# Patient Record
Sex: FEMALE | Race: BLACK OR AFRICAN AMERICAN | Employment: FULL TIME | URBAN - METROPOLITAN AREA
[De-identification: names, ages, dates, MRNs, and addresses within clinical notes are randomized per-mention and may not be internally consistent; named-entity substitution may affect disease eponyms.]

---

## 2017-08-26 ENCOUNTER — APPOINTMENT (OUTPATIENT)
Dept: GENERAL RADIOLOGY | Age: 32
End: 2017-08-26
Attending: EMERGENCY MEDICINE
Payer: COMMERCIAL

## 2017-08-26 ENCOUNTER — APPOINTMENT (OUTPATIENT)
Dept: CT IMAGING | Age: 32
End: 2017-08-26
Attending: EMERGENCY MEDICINE
Payer: COMMERCIAL

## 2017-08-26 ENCOUNTER — HOSPITAL ENCOUNTER (OUTPATIENT)
Age: 32
Setting detail: OBSERVATION
Discharge: HOME | End: 2017-08-28
Attending: EMERGENCY MEDICINE | Admitting: INTERNAL MEDICINE
Payer: COMMERCIAL

## 2017-08-26 DIAGNOSIS — G45.9 TRANSIENT CEREBRAL ISCHEMIA, UNSPECIFIED TYPE: Primary | ICD-10-CM

## 2017-08-26 DIAGNOSIS — R55 POSTURAL DIZZINESS WITH PRESYNCOPE: ICD-10-CM

## 2017-08-26 DIAGNOSIS — E55.9 VITAMIN D DEFICIENCY: ICD-10-CM

## 2017-08-26 DIAGNOSIS — R42 POSTURAL DIZZINESS WITH PRESYNCOPE: ICD-10-CM

## 2017-08-26 DIAGNOSIS — H53.133 ACUTE LOSS OF VISION, BILATERAL: ICD-10-CM

## 2017-08-26 DIAGNOSIS — E53.8 B12 DEFICIENCY: ICD-10-CM

## 2017-08-26 LAB
ALBUMIN SERPL-MCNC: 3.8 G/DL (ref 3.5–5)
ALBUMIN/GLOB SERPL: 0.8 {RATIO} (ref 1.1–2.2)
ALP SERPL-CCNC: 86 U/L (ref 45–117)
ALT SERPL-CCNC: 25 U/L (ref 12–78)
ANION GAP SERPL CALC-SCNC: 6 MMOL/L (ref 5–15)
AST SERPL-CCNC: 18 U/L (ref 15–37)
BASOPHILS # BLD: 0 K/UL (ref 0–0.1)
BASOPHILS NFR BLD: 0 % (ref 0–1)
BILIRUB SERPL-MCNC: 0.2 MG/DL (ref 0.2–1)
BUN SERPL-MCNC: 10 MG/DL (ref 6–20)
BUN/CREAT SERPL: 16 (ref 12–20)
CALCIUM SERPL-MCNC: 9.2 MG/DL (ref 8.5–10.1)
CHLORIDE SERPL-SCNC: 100 MMOL/L (ref 97–108)
CO2 SERPL-SCNC: 29 MMOL/L (ref 21–32)
CREAT SERPL-MCNC: 0.61 MG/DL (ref 0.55–1.02)
EOSINOPHIL # BLD: 0 K/UL (ref 0–0.4)
EOSINOPHIL NFR BLD: 0 % (ref 0–7)
ERYTHROCYTE [DISTWIDTH] IN BLOOD BY AUTOMATED COUNT: 15.7 % (ref 11.5–14.5)
ERYTHROCYTE [SEDIMENTATION RATE] IN BLOOD: 10 MM/HR (ref 0–20)
GLOBULIN SER CALC-MCNC: 4.8 G/DL (ref 2–4)
GLUCOSE BLD STRIP.AUTO-MCNC: 81 MG/DL (ref 65–100)
GLUCOSE SERPL-MCNC: 85 MG/DL (ref 65–100)
HCG UR QL: NEGATIVE
HCT VFR BLD AUTO: 37.5 % (ref 35–47)
HGB BLD-MCNC: 12.6 G/DL (ref 11.5–16)
INR PPP: 1 (ref 0.9–1.1)
LYMPHOCYTES # BLD: 1.6 K/UL (ref 0.8–3.5)
LYMPHOCYTES NFR BLD: 32 % (ref 12–49)
MCH RBC QN AUTO: 30.8 PG (ref 26–34)
MCHC RBC AUTO-ENTMCNC: 33.6 G/DL (ref 30–36.5)
MCV RBC AUTO: 91.7 FL (ref 80–99)
MONOCYTES # BLD: 0.6 K/UL (ref 0–1)
MONOCYTES NFR BLD: 13 % (ref 5–13)
NEUTS SEG # BLD: 2.7 K/UL (ref 1.8–8)
NEUTS SEG NFR BLD: 55 % (ref 32–75)
PLATELET # BLD AUTO: 268 K/UL (ref 150–400)
POTASSIUM SERPL-SCNC: 4 MMOL/L (ref 3.5–5.1)
PROT SERPL-MCNC: 8.6 G/DL (ref 6.4–8.2)
PROTHROMBIN TIME: 10.1 SEC (ref 9–11.1)
RBC # BLD AUTO: 4.09 M/UL (ref 3.8–5.2)
SERVICE CMNT-IMP: NORMAL
SODIUM SERPL-SCNC: 135 MMOL/L (ref 136–145)
TSH SERPL DL<=0.05 MIU/L-ACNC: 1.17 UIU/ML (ref 0.36–3.74)
WBC # BLD AUTO: 5 K/UL (ref 3.6–11)

## 2017-08-26 PROCEDURE — 85610 PROTHROMBIN TIME: CPT | Performed by: EMERGENCY MEDICINE

## 2017-08-26 PROCEDURE — 74011250637 HC RX REV CODE- 250/637: Performed by: INTERNAL MEDICINE

## 2017-08-26 PROCEDURE — 70450 CT HEAD/BRAIN W/O DYE: CPT

## 2017-08-26 PROCEDURE — 82962 GLUCOSE BLOOD TEST: CPT

## 2017-08-26 PROCEDURE — 93005 ELECTROCARDIOGRAM TRACING: CPT

## 2017-08-26 PROCEDURE — 74011250636 HC RX REV CODE- 250/636: Performed by: INTERNAL MEDICINE

## 2017-08-26 PROCEDURE — 85652 RBC SED RATE AUTOMATED: CPT | Performed by: INTERNAL MEDICINE

## 2017-08-26 PROCEDURE — 36415 COLL VENOUS BLD VENIPUNCTURE: CPT | Performed by: EMERGENCY MEDICINE

## 2017-08-26 PROCEDURE — 99218 HC RM OBSERVATION: CPT

## 2017-08-26 PROCEDURE — 71010 XR CHEST PORT: CPT

## 2017-08-26 PROCEDURE — 99285 EMERGENCY DEPT VISIT HI MDM: CPT

## 2017-08-26 PROCEDURE — 80053 COMPREHEN METABOLIC PANEL: CPT | Performed by: EMERGENCY MEDICINE

## 2017-08-26 PROCEDURE — 71020 XR CHEST PA LAT: CPT

## 2017-08-26 PROCEDURE — 85025 COMPLETE CBC W/AUTO DIFF WBC: CPT | Performed by: EMERGENCY MEDICINE

## 2017-08-26 PROCEDURE — 81025 URINE PREGNANCY TEST: CPT

## 2017-08-26 PROCEDURE — 84443 ASSAY THYROID STIM HORMONE: CPT | Performed by: INTERNAL MEDICINE

## 2017-08-26 PROCEDURE — 96372 THER/PROPH/DIAG INJ SC/IM: CPT

## 2017-08-26 PROCEDURE — 86140 C-REACTIVE PROTEIN: CPT | Performed by: INTERNAL MEDICINE

## 2017-08-26 RX ORDER — GUAIFENESIN 100 MG/5ML
81 LIQUID (ML) ORAL DAILY
Status: DISCONTINUED | OUTPATIENT
Start: 2017-08-27 | End: 2017-08-28

## 2017-08-26 RX ORDER — LABETALOL HYDROCHLORIDE 5 MG/ML
5 INJECTION, SOLUTION INTRAVENOUS
Status: DISCONTINUED | OUTPATIENT
Start: 2017-08-26 | End: 2017-08-28 | Stop reason: HOSPADM

## 2017-08-26 RX ORDER — SODIUM CHLORIDE 0.9 % (FLUSH) 0.9 %
5-10 SYRINGE (ML) INJECTION AS NEEDED
Status: DISCONTINUED | OUTPATIENT
Start: 2017-08-26 | End: 2017-08-28 | Stop reason: HOSPADM

## 2017-08-26 RX ORDER — ENOXAPARIN SODIUM 100 MG/ML
40 INJECTION SUBCUTANEOUS DAILY
Status: DISCONTINUED | OUTPATIENT
Start: 2017-08-26 | End: 2017-08-28 | Stop reason: HOSPADM

## 2017-08-26 RX ORDER — ACETAMINOPHEN 325 MG/1
650 TABLET ORAL
Status: DISCONTINUED | OUTPATIENT
Start: 2017-08-26 | End: 2017-08-28 | Stop reason: HOSPADM

## 2017-08-26 RX ORDER — ACETAMINOPHEN 650 MG/1
650 SUPPOSITORY RECTAL
Status: DISCONTINUED | OUTPATIENT
Start: 2017-08-26 | End: 2017-08-28 | Stop reason: HOSPADM

## 2017-08-26 RX ORDER — LANOLIN ALCOHOL/MO/W.PET/CERES
325 CREAM (GRAM) TOPICAL
COMMUNITY
End: 2017-08-28

## 2017-08-26 RX ORDER — SODIUM CHLORIDE 0.9 % (FLUSH) 0.9 %
5-10 SYRINGE (ML) INJECTION EVERY 8 HOURS
Status: DISCONTINUED | OUTPATIENT
Start: 2017-08-26 | End: 2017-08-28 | Stop reason: HOSPADM

## 2017-08-26 RX ORDER — ASPIRIN 325 MG
325 TABLET ORAL ONCE
Status: COMPLETED | OUTPATIENT
Start: 2017-08-26 | End: 2017-08-26

## 2017-08-26 RX ADMIN — ENOXAPARIN SODIUM 40 MG: 40 INJECTION SUBCUTANEOUS at 21:06

## 2017-08-26 RX ADMIN — ASPIRIN 325 MG ORAL TABLET 325 MG: 325 PILL ORAL at 21:06

## 2017-08-26 RX ADMIN — Medication 10 ML: at 21:07

## 2017-08-26 NOTE — ED PROVIDER NOTES
HPI Comments: Drake Benito is a 36 y.o. female presenting via EMS to [facility] c/o intermittent left face and left arm numbness since ~ 1.5 hours ago. Pt notes additional symptoms of dizziness and left eye blurry vision. She reports that she has had 3 episodes of the dizziness since her symptoms started. Each of her episodes have last for a few seconds. Her first episode occurred while driving, and after sitting for a while her symptoms resolved, but then returned 2 more times. Pt notes that ~1.5 years ago she had similar symptoms and was evaluated at [facility], and no diagnosis was given to her at the time. She has not had any follow up since then, and has not had any issues with her symptoms until today. Per pt, she is right handed. Pt notes that she gave birth 2 months ago. She denies headache, neck pain, double vision, eye pain, falls, head injury, use of birth control, hx of HTN, or FMHx of brain aneurysms. PCP: Not On File [facility]  Social Hx: - tobacco, + EtOH (occasional), - illicit drugs    There are no other complaints, changes, or physical findings at this time. The history is provided by the patient. No  was used. Past Medical History:   Diagnosis Date    Asthma     HX OTHER MEDICAL     uterine fibroids    Infertility     has had in vitro in the past    Unspecified adverse effect of anesthesia     pt had to be put to sleep for  because pt could still feel with epidural       Past Surgical History:   Procedure Laterality Date    HX  SECTION           Family History:   Problem Relation Age of Onset    Diabetes Father     Hypertension Father      ? Social History     Social History    Marital status:      Spouse name: N/A    Number of children: N/A    Years of education: N/A     Occupational History    Not on file.      Social History Main Topics    Smoking status: Never Smoker    Smokeless tobacco: Never Used    Alcohol use 0.5 oz/week     1 Cans of beer per week      Comment: 1 can beer occasionally    Drug use: No    Sexual activity: Yes     Partners: Male     Birth control/ protection: None     Other Topics Concern    Not on file     Social History Narrative         ALLERGIES: Hydroquinone    Review of Systems   Constitutional: Negative. Negative for chills and fever. HENT: Negative. Negative for congestion and rhinorrhea. Eyes: Positive for visual disturbance (left eye blurry). Negative for pain. Respiratory: Negative. Negative for cough, chest tightness and wheezing. Cardiovascular: Negative. Negative for chest pain and palpitations. Gastrointestinal: Negative. Negative for abdominal pain, constipation, nausea and vomiting. Endocrine: Negative. Genitourinary: Negative. Negative for decreased urine volume, flank pain, hematuria and pelvic pain. Musculoskeletal: Negative. Negative for back pain and neck pain. Skin: Negative. Negative for color change, pallor and rash. Neurological: Positive for dizziness and numbness. Negative for seizures, weakness and headaches. Hematological: Negative. Negative for adenopathy. Psychiatric/Behavioral: Negative. All other systems reviewed and are negative. Vitals:    08/26/17 1830 08/26/17 1845 08/26/17 1900 08/26/17 2017   BP:    (!) 133/92   Pulse: 68 78 83 69   Resp: 16 20 17 18   Temp:    98.1 °F (36.7 °C)   SpO2: 100% 100% 100% 100%            Physical Exam   Constitutional: She is oriented to person, place, and time. She appears well-developed and well-nourished. No distress. HENT:   Head: Normocephalic and atraumatic. Mouth/Throat: No oropharyngeal exudate. Eyes: Conjunctivae are normal. Pupils are equal, round, and reactive to light. Right eye exhibits no discharge. Left eye exhibits no discharge. No scleral icterus. Neck: Normal range of motion and phonation normal. Neck supple. No JVD present. Carotid bruit is not present. Cardiovascular: Normal rate, regular rhythm, normal heart sounds and intact distal pulses. Exam reveals no gallop and no friction rub. No murmur heard. Pulmonary/Chest: Effort normal and breath sounds normal. No stridor. No respiratory distress. She has no wheezes. She has no rales. She exhibits no tenderness. Abdominal: Soft. Bowel sounds are normal. She exhibits no distension and no mass. There is no tenderness. There is no rebound and no guarding. Neurological: She is alert and oriented to person, place, and time. She has normal strength. She is not disoriented. She displays no tremor and normal reflexes. A sensory deficit is present. No cranial nerve deficit. She exhibits normal muscle tone. She displays a negative Romberg sign. She displays no seizure activity. Coordination normal. GCS eye subscore is 4. GCS verbal subscore is 5. GCS motor subscore is 6. Reflex Scores:       Patellar reflexes are 2+ on the right side and 2+ on the left side. Subject left face and left arm, has mostly resolved   Skin: Skin is warm. No rash noted. She is not diaphoretic. No pallor. Nursing note and vitals reviewed. MDM  Number of Diagnoses or Management Options  Transient cerebral ischemia, unspecified type:   Diagnosis management comments: DDx: CVA, seizure, complicated migraine, sagittal sinus thrombosis, subarachnoid hemorrhage, vertebral artery dissection, pre-eclampsia    Impression/plan: Patient is 2 months post-partum without compliocation to the ER with intermittent brief episodes of left facial and left arm numbness with associated dizziness. She has had 3 episodes since 1:30 PM. On arrival her symptoms have improved almost to baseline. She is normotensive and not on birth control pills. Will discuss case with neurology. Will probably need admission for further evaluation.        Amount and/or Complexity of Data Reviewed  Clinical lab tests: ordered and reviewed  Tests in the radiology section of CPT®: ordered and reviewed  Tests in the medicine section of CPT®: ordered and reviewed  Obtain history from someone other than the patient: yes  Review and summarize past medical records: yes  Discuss the patient with other providers: yes (Neurology, Hospitalist)  Independent visualization of images, tracings, or specimens: yes    Risk of Complications, Morbidity, and/or Mortality  Presenting problems: moderate  Diagnostic procedures: moderate  Management options: moderate    Patient Progress  Patient progress: stable    ED Course       Procedures    EKG interpretation: (Preliminary)  2:19 PM  Rhythm: normal sinus rhythm; and regular . Rate (approx.): 66; Axis: normal; UT interval: normal; QRS interval: Q wave leads 2, 3; ST/T wave: normal.    Health system  1985    Arrival time to ED: 2:20 PM    Code S Called: 2:36 PM    Physician at Bedside: 2:39 PM     CT Order Time: 2:45 PM    ACT Page: 3:12 PM    ACT Call Back: 3:17 PM    Cancel Code S: 3:17 PM    CONSULT NOTE:  3:17 PM  Lisy Fuentes MD spoke with Dr. Margarita Sears  Specialty: Neurology  Discussed pt's hx, disposition, and available diagnostic and imaging results. Reviewed care plans. Consultant agrees with plans as outlined. Dr. Atul Kearney does not think pt needs TPA or emergency CTA due to low NIH score of probably 1 or less. Dr. Atul Kearney does recommend admission, MRI, MRA, and possible MRV to rule out sagittal sinus thrombosis. Written by Ventura Anton, ED Scribe, as dictated by Lisy Fuentes MD.    CONSULT NOTE:   4:49 PM  Lisy Fuentes MD spoke with Dr. Oscar Odom,   Specialty: Hospitalist  Discussed pt's hx, disposition, and available diagnostic and imaging results. Reviewed care plans. Consultant will evaluate pt for admission.   Written by Ventura Anton ED Scribe, as dictated by Lisy Fuentes MD.    LABORATORY TESTS:  Recent Results (from the past 12 hour(s))   GLUCOSE, POC    Collection Time: 08/26/17  2:48 PM   Result Value Ref Range    Glucose (POC) 81 65 - 100 mg/dL    Performed by Tonia Potter    HCG URINE, QL. - POC    Collection Time: 08/26/17  2:56 PM   Result Value Ref Range    Pregnancy test,urine (POC) NEGATIVE  NEG     CBC WITH AUTOMATED DIFF    Collection Time: 08/26/17  3:41 PM   Result Value Ref Range    WBC 5.0 3.6 - 11.0 K/uL    RBC 4.09 3.80 - 5.20 M/uL    HGB 12.6 11.5 - 16.0 g/dL    HCT 37.5 35.0 - 47.0 %    MCV 91.7 80.0 - 99.0 FL    MCH 30.8 26.0 - 34.0 PG    MCHC 33.6 30.0 - 36.5 g/dL    RDW 15.7 (H) 11.5 - 14.5 %    PLATELET 830 704 - 302 K/uL    NEUTROPHILS 55 32 - 75 %    LYMPHOCYTES 32 12 - 49 %    MONOCYTES 13 5 - 13 %    EOSINOPHILS 0 0 - 7 %    BASOPHILS 0 0 - 1 %    ABS. NEUTROPHILS 2.7 1.8 - 8.0 K/UL    ABS. LYMPHOCYTES 1.6 0.8 - 3.5 K/UL    ABS. MONOCYTES 0.6 0.0 - 1.0 K/UL    ABS. EOSINOPHILS 0.0 0.0 - 0.4 K/UL    ABS. BASOPHILS 0.0 0.0 - 0.1 K/UL   PROTHROMBIN TIME + INR    Collection Time: 08/26/17  3:41 PM   Result Value Ref Range    INR 1.0 0.9 - 1.1      Prothrombin time 10.1 9.0 - 12.5 sec   METABOLIC PANEL, COMPREHENSIVE    Collection Time: 08/26/17  3:41 PM   Result Value Ref Range    Sodium 135 (L) 136 - 145 mmol/L    Potassium 4.0 3.5 - 5.1 mmol/L    Chloride 100 97 - 108 mmol/L    CO2 29 21 - 32 mmol/L    Anion gap 6 5 - 15 mmol/L    Glucose 85 65 - 100 mg/dL    BUN 10 6 - 20 MG/DL    Creatinine 0.61 0.55 - 1.02 MG/DL    BUN/Creatinine ratio 16 12 - 20      GFR est AA >60 >60 ml/min/1.73m2    GFR est non-AA >60 >60 ml/min/1.73m2    Calcium 9.2 8.5 - 10.1 MG/DL    Bilirubin, total 0.2 0.2 - 1.0 MG/DL    ALT (SGPT) 25 12 - 78 U/L    AST (SGOT) 18 15 - 37 U/L    Alk.  phosphatase 86 45 - 117 U/L    Protein, total 8.6 (H) 6.4 - 8.2 g/dL    Albumin 3.8 3.5 - 5.0 g/dL    Globulin 4.8 (H) 2.0 - 4.0 g/dL    A-G Ratio 0.8 (L) 1.1 - 2.2         IMAGING RESULTS:    EXAM:  CT CODE NEURO HEAD WO CONTRAST     INDICATION:   Near syncopal episode-decreased sensation left arm and face     COMPARISON: None.     CONTRAST:  None.     TECHNIQUE: Unenhanced CT of the head was performed using 5 mm images. Brain and  bone windows were generated. CT dose reduction was achieved through use of a  standardized protocol tailored for this examination and automatic exposure  control for dose modulation.       TECHNIQUE:    Unenhanced CT of the head was performed using 5 mm images. Brain and bone  windows were generated. Coronal and sagittal reformatted images were then  obtained. CT dose reduction was achieved through the use of a standardized  protocol tailored for this examination and automatic exposure control for dose  modulation.     FINDINGS:  The ventricles and sulci are normal in size, shape and configuration and  midline. There is no intracranial hemorrhage, extra-axial collection, mass, mass effect  or midline shift. The basilar cisterns are open. No acute infarct is identified. Focal mucosal thickening of the left ethmoid air cell is noted. The mastoid air cells are clear.     IMPRESSION  IMPRESSION:   Normal ventricular size.     No intracranial bleed or shift of the midline.          MEDICATIONS GIVEN:  Medications   sodium chloride (NS) flush 5-10 mL (not administered)   sodium chloride (NS) flush 5-10 mL (10 mL IntraVENous Given 8/26/17 2107)   sodium chloride (NS) flush 5-10 mL (not administered)   aspirin chewable tablet 81 mg (not administered)   labetalol (NORMODYNE;TRANDATE) injection 5 mg (not administered)   acetaminophen (TYLENOL) tablet 650 mg (not administered)     Or   acetaminophen (TYLENOL) solution 650 mg (not administered)     Or   acetaminophen (TYLENOL) suppository 650 mg (not administered)   enoxaparin (LOVENOX) injection 40 mg (40 mg SubCUTAneous Given 8/26/17 2106)   aspirin (ASPIRIN) tablet 325 mg (325 mg Oral Given 8/26/17 2106)       IMPRESSION:  1. Transient cerebral ischemia, unspecified type        PLAN:  1.  Admit to hospitalist    ADMIT NOTE:  4:49 PM  Patient is being admitted to the hospital by Dr. Kimberli Banerjee. The results of their tests and reasons for their admission have been discussed with the patient and/or available family. They convey agreement and understanding for the need to be admitted and for their admission diagnosis. This note is prepared by Yonatan Ellison, acting as Scribe for MD Adrienne Gonzales MD: The scribe's documentation has been prepared under my direction and personally reviewed by me in its entirety. I confirm that the note above accurately reflects all work, treatment, procedures, and medical decision making performed by me.

## 2017-08-26 NOTE — ED NOTES
Patient was provided dinner tray and was able to eat without difficulty. She is resting in bed in no apparent distress. Call bell in reach, bed in low locked position. Tech at bedside performing repeat EKG.

## 2017-08-26 NOTE — IP AVS SNAPSHOT
[address] [facility] 
[phone] Patient: Leonel Clark MRN: [id number] :1985 You are allergic to the following Allergen Reactions Hydroquinone Itching Recent Documentation Breastfeeding? OB Status Smoking Status No Having regular periods Never Smoker Unresulted Labs Order Current Status ANGIOTENSIN CONVERTING ENZYME In process PROTEIN C ACTIVITY In process Emergency Contacts Name Discharge Info Relation Home Work Mobile Ariadne Burch DISCHARGE CAREGIVER [3] Other Relative [6]   [phone] Jabari Armstrong  Brother [24] [phone] About your hospitalization You were admitted on:  2017 You last received care in the:  [facility] 3 NEUROSCIENCE TELEMETRY You were discharged on:  2017 Unit phone number:  [phone] Why you were hospitalized Your primary diagnosis was:  Not on File Your diagnoses also included:  Tia (Transient Ischemic Attack), Hyperlipidemia, Pharyngitis Providers Seen During Your Hospitalizations Provider Role Specialty Primary office phone Tacho Ronquillo MD Attending Provider Emergency Medicine [phone] Julianne Lara MD Attending Provider Hospitalist [phone] Your Primary Care Physician (PCP) Primary Care Physician Office Phone Office Fax NOT ON FILE ** None ** ** None ** Follow-up Information Follow up With Details Comments Contact Info Dr. Gisel Kelly On 2017 1;40pm hospital follow-up [facility], [address] [place] [address] [phone] Shira Orozco On 2017 2;00pm  neurology  follow-up [address] [phone] Current Discharge Medication List  
  
START taking these medications Dose & Instructions Dispensing Information Comments Morning Noon Evening Bedtime  
 acetaminophen 325 mg tablet Commonly known as:  TYLENOL Your last dose was: Your next dose is:    
   
   
 Dose:  650 mg Take 2 Tabs by mouth every four (4) hours as needed for Fever (For temp greater than or equal to 38.5 C or 101.3 F (Unless hepatic failure or contrindicated). Give first line for fever. ). Quantity:  40 Tab Refills:  0  
     
   
   
   
  
 aspirin 81 mg chewable tablet Your last dose was: Your next dose is:    
   
   
 Dose:  81 mg Take 1 Tab by mouth daily. Quantity:  30 Tab Refills:  1  
     
   
   
   
  
 atorvastatin 20 mg tablet Commonly known as:  LIPITOR Your last dose was: Your next dose is:    
   
   
 Dose:  20 mg Take 1 Tab by mouth daily. Quantity:  30 Tab Refills:  1  
     
   
   
   
  
 azithromycin 500 mg Tab Commonly known as:  Seferino Ellis Your last dose was: Your next dose is:    
   
   
 Dose:  500 mg Take 1 Tab by mouth daily. Quantity:  5 Tab Refills:  0 CONTINUE these medications which have NOT CHANGED Dose & Instructions Dispensing Information Comments Morning Noon Evening Bedtime MOTRIN 600 mg tablet Generic drug:  ibuprofen Your last dose was: Your next dose is:    
   
   
 Dose:  600 mg Take 600 mg by mouth as needed for Pain. Refills:  0 STOP taking these medications Iron 325 mg (65 mg iron) tablet Generic drug:  ferrous sulfate PERCOCET 5-325 mg per tablet Generic drug:  oxyCODONE-acetaminophen Where to Get Your Medications Information on where to get these meds will be given to you by the nurse or doctor. ! Ask your nurse or doctor about these medications  
  acetaminophen 325 mg tablet  
 aspirin 81 mg chewable tablet  
 atorvastatin 20 mg tablet azithromycin 500 mg Tab Discharge Instructions HOSPITALIST DISCHARGE INSTRUCTIONS 
NAME: Lupe Reid :  1985 MRN:  [id number] Date/Time:  2017 2:58 PM 
 
ADMIT DATE: 2017 DISCHARGE DATE: 2017 ADMITTING DIAGNOSIS:  TIA, Asthma, H/o Delivery 2 months ago, Pharyngitis, hyperlipidemia, MEDICATIONS: 
  
 
         As per medication reconciliation · It is important that you take the medication exactly as they are prescribed. · Keep your medication in the bottles provided by the pharmacist and keep a list of the medication names, dosages, and times to be taken in your wallet. · Do not take other medications without consulting your doctor. Pain Management: per above medications What to do at [facility] Recommended diet:  Regular Diet Recommended activity: Activity as tolerated If you experience any of the following symptoms then please call your primary care physician or return to the emergency room if you cannot get hold of your doctor: 
Fever, chills, nausea, vomiting, diarrhea, change in mentation, falling, bleeding, shortness of breath. Follow Up: 
 PCP you are to call and set up an appointment to see them in 2 week. F/U Neurology Information obtained by : 
I understand that if any problems occur once I am at home I am to contact my physician. I understand and acknowledge receipt of the instructions indicated above. Physician's or R.N.'s Signature                                                                  Date/Time Patient or Representative Signature                                                          Date/Time Discharge Orders None [employer] Announcement We are excited to announce that we are making your provider's discharge notes available to you in [employer]. You will see these notes when they are completed and signed by the physician that discharged you from your recent hospital stay. If you have any questions or concerns about any information you see in [employer], please call the Health Information Department where you were seen or reach out to your Primary Care Provider for more information about your plan of care. Introducing [facility]! Caro Reyez introduces [employer] patient portal. Now you can access parts of your medical record, email your doctor's office, and request medication refills online. 1. In your internet browser, go to [email or web address] 2. Click on the First Time User? Click Here link in the Sign In box. You will see the New Member Sign Up page. 3. Enter your [employer] Access Code exactly as it appears below. You will not need to use this code after youve completed the sign-up process. If you do not sign up before the expiration date, you must request a new code. · [employer] Access Code: [email or web address] Expires: 11/26/2017  4:14 PM 
 
4. Enter the last four digits of your Social Security Number (xxxx) and Date of Birth (mm/dd/yyyy) as indicated and click Submit. You will be taken to the next sign-up page. 5. Create a [employer] ID. This will be your [employer] login ID and cannot be changed, so think of one that is secure and easy to remember. 6. Create a [employer] password. You can change your password at any time. 7. Enter your Password Reset Question and Answer. This can be used at a later time if you forget your password. 8. Enter your e-mail address. You will receive e-mail notification when new information is available in [address] 9. Click Sign Up. You can now view and download portions of your medical record. 10. Click the Download Summary menu link to download a portable copy of your medical information. If you have questions, please visit the Frequently Asked Questions section of the [employer] website. Remember, MyChart is NOT to be used for urgent needs. For medical emergencies, dial 911. Now available from your iPhone and Android! General Information Please provide this summary of care documentation to your next provider. Patient Signature:  ____________________________________________________________ Date:  ____________________________________________________________  
  
[facility] Provider Signature:  ____________________________________________________________ Date:  ____________________________________________________________

## 2017-08-26 NOTE — ED NOTES
Verbal report was given to Tess Servin RN who will assume care of patient at this time. Receiving nurse had an opportunity to ask questions and seek clarifications. Receiving nurse aware of pending lab results and orders that need to be completed.

## 2017-08-26 NOTE — ED TRIAGE NOTES
Patient is two months postpartum and describes a near-syncopal event today as well as decreased sensation to her left arm and face. Patient has no visible neurological deficits on arrival. She describes a similar event about a 18 months ago and was worked up through [facility], no diagnosis was given. She says the lightheadedness is intermittent. She denies having any pain but says the left side of her head \"feels funny. \" Patient has history of migraines but says these symptoms aren't typical.

## 2017-08-26 NOTE — ED NOTES
Assumed care of patient from [place]. Patient resting comfortably in no apparent distress. On monitor x 3. Call bell within reach. Plan of care discussed.

## 2017-08-26 NOTE — ED NOTES
TRANSFER - OUT REPORT:    Verbal report given to JAGDISH Nicole(name) on Kal Adams  being transferred to Neuro(unit) for routine progression of care       Report consisted of patients Situation, Background, Assessment and   Recommendations(SBAR). Information from the following report(s) SBAR, ED Summary, Procedure Summary and Recent Results was reviewed with the receiving nurse. Lines:       Opportunity for questions and clarification was provided.       Patient transported with:   [employer]

## 2017-08-26 NOTE — IP AVS SNAPSHOT
[address] [facility]. 
[phone] Patient: Mariano Rebollar MRN: [id number] :1985 Current Discharge Medication List  
  
START taking these medications Dose & Instructions Dispensing Information Comments Morning Noon Evening Bedtime  
 acetaminophen 325 mg tablet Commonly known as:  TYLENOL Your last dose was: Your next dose is:    
   
   
 Dose:  650 mg Take 2 Tabs by mouth every four (4) hours as needed for Fever (For temp greater than or equal to 38.5 C or 101.3 F (Unless hepatic failure or contrindicated). Give first line for fever. ). Quantity:  40 Tab Refills:  0  
     
   
   
   
  
 aspirin 81 mg chewable tablet Your last dose was: Your next dose is:    
   
   
 Dose:  81 mg Take 1 Tab by mouth daily. Quantity:  30 Tab Refills:  1  
     
   
   
   
  
 atorvastatin 20 mg tablet Commonly known as:  LIPITOR Your last dose was: Your next dose is:    
   
   
 Dose:  20 mg Take 1 Tab by mouth daily. Quantity:  30 Tab Refills:  1  
     
   
   
   
  
 azithromycin 500 mg Tab Commonly known as:  Juan M Clos Your last dose was: Your next dose is:    
   
   
 Dose:  500 mg Take 1 Tab by mouth daily. Quantity:  5 Tab Refills:  0 CONTINUE these medications which have NOT CHANGED Dose & Instructions Dispensing Information Comments Morning Noon Evening Bedtime MOTRIN 600 mg tablet Generic drug:  ibuprofen Your last dose was: Your next dose is:    
   
   
 Dose:  600 mg Take 600 mg by mouth as needed for Pain. Refills:  0 STOP taking these medications Iron 325 mg (65 mg iron) tablet Generic drug:  ferrous sulfate PERCOCET 5-325 mg per tablet Generic drug:  oxyCODONE-acetaminophen Where to Get Your Medications Information on where to get these meds will be given to you by the nurse or doctor. ! Ask your nurse or doctor about these medications  
  acetaminophen 325 mg tablet  
 aspirin 81 mg chewable tablet  
 atorvastatin 20 mg tablet  
 azithromycin 500 mg Tab

## 2017-08-26 NOTE — IP AVS SNAPSHOT
Summary of Care Report The Summary of Care report has been created to help improve care coordination. Users with access to [employer] or [address] (Web-based application) may access additional patient information including the Discharge Summary. If you are not currently a [address] user and need more information, please call the number listed below in the [place] [address] section and ask to be connected with Medical Records. Facility Information Name Address Phone [address] [address] [phone] Patient Information Patient Name Sex RONI Madden ([id number]) Female 1985 Discharge Information Admitting Provider Service Area Unit Soy Patel MD / [facility] [address] / [phone] Discharge Provider Discharge Date/Time Discharge Disposition Destination (none) 2017 (Pending) AHR (none) Patient Language Language ENGLISH [13] Hospital Problems as of 2017  Reviewed: 2017  2:19 PM by Vann Schilder, MD  
  
  
  
 Class Noted - Resolved Last Modified POA Active Problems TIA (transient ischemic attack)  2017 - Present 2017 by Soy Patel MD Yes Entered by Soy Patel MD  
  Hyperlipidemia  2017 - Present 2017 by Vann Schilder, MD Yes Entered by Vann Schilder, MD  
  Pharyngitis  2017 - Present 2017 by Vann Schilder, MD Yes Entered by Vann Schilder, MD  
  
Non-Hospital Problems as of 2017  Reviewed: 2017  2:19 PM by Vann Schilder, MD  
  
  
  
 Class Noted - Resolved Last Modified Active Problems Breech presentation  10/9/2013 - Present 10/26/2013 Entered by Mahesh Lang MD  
  
You are allergic to the following Allergen Reactions Hydroquinone Itching Current Discharge Medication List  
  
START taking these medications Dose & Instructions Dispensing Information Comments  
 acetaminophen 325 mg tablet Commonly known as:  TYLENOL Dose:  650 mg Take 2 Tabs by mouth every four (4) hours as needed for Fever (For temp greater than or equal to 38.5 C or 101.3 F (Unless hepatic failure or contrindicated). Give first line for fever. ). Quantity:  40 Tab Refills:  0  
   
 aspirin 81 mg chewable tablet Dose:  81 mg Take 1 Tab by mouth daily. Quantity:  30 Tab Refills:  1  
   
 atorvastatin 20 mg tablet Commonly known as:  LIPITOR Dose:  20 mg Take 1 Tab by mouth daily. Quantity:  30 Tab Refills:  1  
   
 azithromycin 500 mg Tab Commonly known as:  Pat Croak Dose:  500 mg Take 1 Tab by mouth daily. Quantity:  5 Tab Refills:  0 CONTINUE these medications which have NOT CHANGED Dose & Instructions Dispensing Information Comments MOTRIN 600 mg tablet Generic drug:  ibuprofen Dose:  600 mg Take 600 mg by mouth as needed for Pain. Refills:  0 STOP taking these medications Comments Iron 325 mg (65 mg iron) tablet Generic drug:  ferrous sulfate PERCOCET 5-325 mg per tablet Generic drug:  oxyCODONE-acetaminophen Current Immunizations Name Date Influenza Vaccine 2013 Follow-up Information Follow up With Details Comments Contact Info Dr. Sravani Duenas On 2017 1;40pm hospital follow-up [facility], [address] [place] [address] [phone] Sonia Jenkins On 2017 2;00pm  neurology  follow-up [facility], [address] [place] [address] 5th Floor [phone] Discharge Instructions HOSPITALIST DISCHARGE INSTRUCTIONS 
NAME: Erickson Todd :  1985 MRN:  [id number] Date/Time:  8/28/2017 2:58 PM 
 
ADMIT DATE: 8/26/2017 DISCHARGE DATE: 8/28/2017 ADMITTING DIAGNOSIS:  TIA, Asthma, H/o Delivery 2 months ago, Pharyngitis, hyperlipidemia, MEDICATIONS: 
  
 
         As per medication reconciliation · It is important that you take the medication exactly as they are prescribed. · Keep your medication in the bottles provided by the pharmacist and keep a list of the medication names, dosages, and times to be taken in your wallet. · Do not take other medications without consulting your doctor. Pain Management: per above medications What to do at [facility] Recommended diet:  Regular Diet Recommended activity: Activity as tolerated If you experience any of the following symptoms then please call your primary care physician or return to the emergency room if you cannot get hold of your doctor: 
Fever, chills, nausea, vomiting, diarrhea, change in mentation, falling, bleeding, shortness of breath. Follow Up: 
 PCP you are to call and set up an appointment to see them in 2 week. F/U Neurology Information obtained by : 
I understand that if any problems occur once I am at home I am to contact my physician. I understand and acknowledge receipt of the instructions indicated above. Physician's or R.N.'s Signature                                                                  Date/Time Patient or Representative Signature                                                          Date/Time Chart Review Routing History No Routing History on File

## 2017-08-26 NOTE — H&P
Hospitalist Admission Note    NAME: Jessika Cherry   :  1985   MRN:  [id number]     Date/Time:  2017 5:31 PM    Patient PCP: Not On File B[facility]si none at the moment as per pt  ________________________________________________________________________    My assessment of this patient's clinical condition and my plan of care is as follows. Assessment / Plan:  TIA  R/o Cerebral venous sinus thrombosis - pt recently postpartum  R/o MS  Initial CT head neg acute  Telemonitor= appears to be in Sinus rhythm  CXR neg    Observation on neuro/tele bed   mg x 1 now, cont ASA 81 mg daily in AM  Check MRI Brain without & with contrast (for MS), MRA head & neck, MRV brain  Check 2DEcho  Check Lipid panel, A1c, TSH, ESR & cRP as per protocol  IP neurology consult as per protocol  IP PT/OT/SLP  IV labetalol prn    Recent post partum status after vaginal delivery  H/o CSection in past  Asthma- childhood only, stable at the moment      Code Status: Full  Surrogate Decision Maker: brother Becca Marr # [phone]    DVT Prophylaxis: SQ lovenox  GI Prophylaxis: not indicated    Baseline: Pt is functional at baseline, works at [address] [place] [address] with [employer]        Subjective:   CHIEF COMPLAINT: intermittent L facial numbness with L arm numbness    HISTORY OF PRESENT ILLNESS:    Tyler Abrams is a 36 y.o. [place]   female who presents with above complains from home via EMS. Pt presents with CC of intermittent (3 times today) L face & arm numbness x 1 day  Pt had recently delivered vaginally , not breast feeding . H/o similar symptoms last year- was worked up at [facility] with MRI- negative workup then  H/o associated palpitation today with symptoms. No h/o drug abuse  Denies headache  H/o some blurry vision earlier at home- now resolved    Pt sitting comfortably in bed when seen by me, all symptoms almost resolved except some facial numbness still present.     We were asked to admit for work up and evaluation of the above problems. Past Medical History:   Diagnosis Date    Asthma     HX OTHER MEDICAL     uterine fibroids    Infertility     has had in vitro in the past    Unspecified adverse effect of anesthesia     pt had to be put to sleep for  because pt could still feel with epidural        Past Surgical History:   Procedure Laterality Date    HX  SECTION         Social History   Substance Use Topics    Smoking status: Never Smoker    Smokeless tobacco: Never Used    Alcohol use 0.5 oz/week     1 Cans of beer per week      Comment: 1 can beer occasionally        Family History   Problem Relation Age of Onset    Diabetes Father     Hypertension Father      ? Allergies   Allergen Reactions    Hydroquinone Itching        Prior to Admission medications    Medication Sig Start Date End Date Taking? Authorizing Provider   ibuprofen (MOTRIN) 600 mg tablet Take 600 mg by mouth as needed for Pain. Historical Provider   oxyCODONE-acetaminophen (PERCOCET) 5-325 mg per tablet Take 1 Tab by mouth every four (4) hours as needed for Pain.     Historical Provider       REVIEW OF SYSTEMS:           Total of 12 systems reviewed as follows:       POSITIVE= underlined text  Negative = text not underlined  General:  fever, chills, sweats, generalized weakness, weight loss/gain,      loss of appetite   Eyes:    blurred vision, eye pain, loss of vision, double vision  ENT:    rhinorrhea, pharyngitis   Respiratory:   cough, sputum production, SOB, GRAVES, wheezing, pleuritic pain   Cardiology:   chest pain, palpitations, orthopnea, PND, edema, syncope   Gastrointestinal:  abdominal pain , N/V, diarrhea, dysphagia, constipation, bleeding   Genitourinary:  frequency, urgency, dysuria, hematuria, incontinence   Muskuloskeletal :  arthralgia, myalgia, back pain  Hematology:  easy bruising, nose or gum bleeding, lymphadenopathy   Dermatological: rash, ulceration, pruritis, color change / jaundice  Endocrine:   hot flashes or polydipsia   Neurological:  headache, dizziness, confusion, focal weakness, paresthesia,     Speech difficulties, memory loss, gait difficulty  Psychological: Feelings of anxiety, depression, agitation    Objective:   VITALS:    Visit Vitals    /71    Pulse 72    Resp 23    SpO2 100%       PHYSICAL EXAM:    General:    Alert, cooperative, no distress, appears stated age. HEENT: Atraumatic, anicteric sclerae, pink conjunctivae     No oral ulcers, mucosa moist, throat clear, dentition fair  Neck:  Supple, symmetrical,  thyroid: non tender  Lungs:   Clear to auscultation bilaterally. No Wheezing or Rhonchi. No rales. Chest wall:  No tenderness  No Accessory muscle use. Heart:   Regular  rhythm,  No  murmur   No edema  Abdomen:   Soft, non-tender. Not distended. Bowel sounds normal  Extremities: No cyanosis. No clubbing,      Skin turgor normal, Capillary refill normal, Radial dial pulse 2+  Skin:     Not pale. Not Jaundiced  No rashes   Psych:  Good insight. Not depressed. Mildly anxious  Neurologic: EOMs intact. No facial asymmetry. No aphasia or slurred speech. Symmetrical strength, L facial subjective numbness noted +.  Alert and oriented X 4.     _______________________________________________________________________  Care Plan discussed with:    Comments   Patient x    Family  x Cousin at bedside   RN x    Care Manager                    Consultant:  luis pederson   _______________________________________________________________________  Expected  Disposition:   Home with Family x   HH/PT/OT/RN    SNF/LTC    GURPREET    ________________________________________________________________________  TOTAL TIME:  54 Minutes    Critical Care Provided     Minutes non procedure based      Comments    x Reviewed previous records   >50% of visit spent in counseling and coordination of care x Discussion with patient and family and questions answered in ER ________________________________________________________________________  Signed: Alivia Villa MD    Procedures: see electronic medical records for all procedures/Xrays and details which were not copied into this note but were reviewed prior to creation of Plan. LAB DATA REVIEWED:    Recent Results (from the past 24 hour(s))   GLUCOSE, POC    Collection Time: 08/26/17  2:48 PM   Result Value Ref Range    Glucose (POC) 81 65 - 100 mg/dL    Performed by Sarah Roldan    HCG URINE, QL. - POC    Collection Time: 08/26/17  2:56 PM   Result Value Ref Range    Pregnancy test,urine (POC) NEGATIVE  NEG     CBC WITH AUTOMATED DIFF    Collection Time: 08/26/17  3:41 PM   Result Value Ref Range    WBC 5.0 3.6 - 11.0 K/uL    RBC 4.09 3.80 - 5.20 M/uL    HGB 12.6 11.5 - 16.0 g/dL    HCT 37.5 35.0 - 47.0 %    MCV 91.7 80.0 - 99.0 FL    MCH 30.8 26.0 - 34.0 PG    MCHC 33.6 30.0 - 36.5 g/dL    RDW 15.7 (H) 11.5 - 14.5 %    PLATELET 068 652 - 656 K/uL    NEUTROPHILS 55 32 - 75 %    LYMPHOCYTES 32 12 - 49 %    MONOCYTES 13 5 - 13 %    EOSINOPHILS 0 0 - 7 %    BASOPHILS 0 0 - 1 %    ABS. NEUTROPHILS 2.7 1.8 - 8.0 K/UL    ABS. LYMPHOCYTES 1.6 0.8 - 3.5 K/UL    ABS. MONOCYTES 0.6 0.0 - 1.0 K/UL    ABS. EOSINOPHILS 0.0 0.0 - 0.4 K/UL    ABS.  BASOPHILS 0.0 0.0 - 0.1 K/UL   PROTHROMBIN TIME + INR    Collection Time: 08/26/17  3:41 PM   Result Value Ref Range    INR 1.0 0.9 - 1.1      Prothrombin time 10.1 9.0 - 97.2 sec   METABOLIC PANEL, COMPREHENSIVE    Collection Time: 08/26/17  3:41 PM   Result Value Ref Range    Sodium 135 (L) 136 - 145 mmol/L    Potassium 4.0 3.5 - 5.1 mmol/L    Chloride 100 97 - 108 mmol/L    CO2 29 21 - 32 mmol/L    Anion gap 6 5 - 15 mmol/L    Glucose 85 65 - 100 mg/dL    BUN 10 6 - 20 MG/DL    Creatinine 0.61 0.55 - 1.02 MG/DL    BUN/Creatinine ratio 16 12 - 20      GFR est AA >60 >60 ml/min/1.73m2    GFR est non-AA >60 >60 ml/min/1.73m2    Calcium 9.2 8.5 - 10.1 MG/DL    Bilirubin, total 0.2 0.2 - 1.0 MG/DL    ALT (SGPT) 25 12 - 78 U/L    AST (SGOT) 18 15 - 37 U/L    Alk.  phosphatase 86 45 - 117 U/L    Protein, total 8.6 (H) 6.4 - 8.2 g/dL    Albumin 3.8 3.5 - 5.0 g/dL    Globulin 4.8 (H) 2.0 - 4.0 g/dL    A-G Ratio 0.8 (L) 1.1 - 2.2

## 2017-08-26 NOTE — Clinical Note
Patient Class[de-identified] Observation [445] Type of Bed: Neuro/Stroke [9] Reason for Observation: TIA, r/o post partum venous sinus thrombosis post 
 Admitting Diagnosis: TIA (transient ischemic attack) [810560] Admitting Physician: Juanjose Gordon [[id number]] Attending Physician: Jenae Richards [[id number]]

## 2017-08-26 NOTE — PROGRESS NOTES
Spiritual Care Assessment/Progress Notes    Jessika Cherry [id number]  [id number]    1985  36 y.o.  female    Patient Telephone Number: [phone] (home)   Gnosticist Affiliation: No Catholic   Language: English   Extended Emergency Contact Information  Primary Emergency Contact: Cliff Chau  Mobile Phone: [phone]  Relation: Other Relative   Patient Active Problem List    Diagnosis Date Noted    Breech presentation 10/09/2013        Date: 8/26/2017       Level of Gnosticist/Spiritual Activity:  []         Involved in prem tradition/spiritual practice    []         Not involved in prem tradition/spiritual practice  []         Spiritually oriented    []         Claims no spiritual orientation    []         seeking spiritual identity  []         Feels alienated from Advent practice/tradition  []         Feels angry about Advent practice/tradition  []         Spirituality/Advent tradition  a resource for coping at this time.   [x]         Not able to assess due to medical condition    Services Provided Today:  []         crisis intervention    []         reading Scriptures  []         spiritual assessment    []         prayer  []         empathic listening/emotional support  []         rites and rituals (cite in comments)  []         life review     []         Advent support  []         theological development   []         advocacy  []         ethical dialog     []         blessing  []         bereavement support    []         support to family  []         anticipatory grief support   []         help with AMD  []         spiritual guidance    []         meditation      Spiritual Care Needs  []         Emotional Support  []         Spiritual/Gnosticist Care  []         Loss/Adjustment  []         Advocacy/Referral                /Ethics  []         No needs expressed at               this time  []         Other: (note in               comments) Spiritual Care Plan  []         Follow up visits with               pt/family  []         Provide materials  []         Schedule sacraments  []         Contact Community               Clergy  []         Follow up as needed  []         Other: (note in               comments)     Comments:  Responded to Code Stroke in ER. No family present. Clinical staff evaluating patient. Chaplains available as needed.   Zee Ruiz, [facility], [facility], [address] [place] [address] [address]     Paging Service  [phone]

## 2017-08-26 NOTE — ED NOTES
No changes to neuro assessment. Patient resting in bed, call bell in reach. Her brother is at bedside. No requests or complaints at this time.

## 2017-08-27 ENCOUNTER — APPOINTMENT (OUTPATIENT)
Dept: MRI IMAGING | Age: 32
End: 2017-08-27
Attending: INTERNAL MEDICINE
Payer: COMMERCIAL

## 2017-08-27 ENCOUNTER — APPOINTMENT (OUTPATIENT)
Dept: CT IMAGING | Age: 32
End: 2017-08-27
Attending: PSYCHIATRY & NEUROLOGY
Payer: COMMERCIAL

## 2017-08-27 ENCOUNTER — APPOINTMENT (OUTPATIENT)
Dept: GENERAL RADIOLOGY | Age: 32
End: 2017-08-27
Attending: INTERNAL MEDICINE
Payer: COMMERCIAL

## 2017-08-27 LAB
ATRIAL RATE: 75 BPM
CALCULATED P AXIS, ECG09: 51 DEGREES
CALCULATED R AXIS, ECG10: 14 DEGREES
CALCULATED T AXIS, ECG11: 33 DEGREES
CHOLEST SERPL-MCNC: 219 MG/DL
CRP SERPL-MCNC: <0.29 MG/DL (ref 0–0.6)
DIAGNOSIS, 93000: NORMAL
EST. AVERAGE GLUCOSE BLD GHB EST-MCNC: 97 MG/DL
HBA1C MFR BLD: 5 % (ref 4.2–6.3)
HDLC SERPL-MCNC: 81 MG/DL
HDLC SERPL: 2.7 {RATIO} (ref 0–5)
LDLC SERPL CALC-MCNC: 101.2 MG/DL (ref 0–100)
LIPID PROFILE,FLP: ABNORMAL
P-R INTERVAL, ECG05: 144 MS
Q-T INTERVAL, ECG07: 380 MS
QRS DURATION, ECG06: 74 MS
QTC CALCULATION (BEZET), ECG08: 424 MS
TRIGL SERPL-MCNC: 184 MG/DL (ref ?–150)
VENTRICULAR RATE, ECG03: 75 BPM
VLDLC SERPL CALC-MCNC: 36.8 MG/DL

## 2017-08-27 PROCEDURE — 96372 THER/PROPH/DIAG INJ SC/IM: CPT

## 2017-08-27 PROCEDURE — 70547 MR ANGIOGRAPHY NECK W/O DYE: CPT

## 2017-08-27 PROCEDURE — 99218 HC RM OBSERVATION: CPT

## 2017-08-27 PROCEDURE — G8979 MOBILITY GOAL STATUS: HCPCS

## 2017-08-27 PROCEDURE — 80061 LIPID PANEL: CPT | Performed by: INTERNAL MEDICINE

## 2017-08-27 PROCEDURE — G8980 MOBILITY D/C STATUS: HCPCS

## 2017-08-27 PROCEDURE — 74011250636 HC RX REV CODE- 250/636: Performed by: INTERNAL MEDICINE

## 2017-08-27 PROCEDURE — 74011250637 HC RX REV CODE- 250/637: Performed by: INTERNAL MEDICINE

## 2017-08-27 PROCEDURE — 70553 MRI BRAIN STEM W/O & W/DYE: CPT

## 2017-08-27 PROCEDURE — 96374 THER/PROPH/DIAG INJ IV PUSH: CPT

## 2017-08-27 PROCEDURE — 97161 PT EVAL LOW COMPLEX 20 MIN: CPT

## 2017-08-27 PROCEDURE — 70496 CT ANGIOGRAPHY HEAD: CPT

## 2017-08-27 PROCEDURE — 74011636320 HC RX REV CODE- 636/320: Performed by: INTERNAL MEDICINE

## 2017-08-27 PROCEDURE — 70544 MR ANGIOGRAPHY HEAD W/O DYE: CPT

## 2017-08-27 PROCEDURE — 74011250636 HC RX REV CODE- 250/636: Performed by: HOSPITALIST

## 2017-08-27 PROCEDURE — 96375 TX/PRO/DX INJ NEW DRUG ADDON: CPT

## 2017-08-27 PROCEDURE — 36415 COLL VENOUS BLD VENIPUNCTURE: CPT | Performed by: INTERNAL MEDICINE

## 2017-08-27 PROCEDURE — G8978 MOBILITY CURRENT STATUS: HCPCS

## 2017-08-27 PROCEDURE — 83036 HEMOGLOBIN GLYCOSYLATED A1C: CPT | Performed by: INTERNAL MEDICINE

## 2017-08-27 PROCEDURE — 74020 XR ABD FLAT/ ERECT: CPT

## 2017-08-27 PROCEDURE — A9576 INJ PROHANCE MULTIPACK: HCPCS | Performed by: INTERNAL MEDICINE

## 2017-08-27 RX ORDER — ONDANSETRON 2 MG/ML
INJECTION INTRAMUSCULAR; INTRAVENOUS
Status: DISPENSED
Start: 2017-08-27 | End: 2017-08-27

## 2017-08-27 RX ORDER — SODIUM CHLORIDE 9 MG/ML
50 INJECTION, SOLUTION INTRAVENOUS
Status: COMPLETED | OUTPATIENT
Start: 2017-08-27 | End: 2017-08-27

## 2017-08-27 RX ORDER — GUAIFENESIN 100 MG/5ML
81 LIQUID (ML) ORAL DAILY
Qty: 30 TAB | Refills: 1 | Status: SHIPPED | OUTPATIENT
Start: 2017-08-27 | End: 2017-08-28

## 2017-08-27 RX ORDER — ONDANSETRON 2 MG/ML
4 INJECTION INTRAMUSCULAR; INTRAVENOUS
Status: DISCONTINUED | OUTPATIENT
Start: 2017-08-27 | End: 2017-08-28 | Stop reason: HOSPADM

## 2017-08-27 RX ORDER — SODIUM CHLORIDE 0.9 % (FLUSH) 0.9 %
10 SYRINGE (ML) INJECTION
Status: COMPLETED | OUTPATIENT
Start: 2017-08-27 | End: 2017-08-27

## 2017-08-27 RX ADMIN — Medication 10 ML: at 15:25

## 2017-08-27 RX ADMIN — SODIUM CHLORIDE 50 ML/HR: 900 INJECTION, SOLUTION INTRAVENOUS at 21:29

## 2017-08-27 RX ADMIN — IOPAMIDOL 100 ML: 755 INJECTION, SOLUTION INTRAVENOUS at 21:30

## 2017-08-27 RX ADMIN — Medication 10 ML: at 22:21

## 2017-08-27 RX ADMIN — Medication 10 ML: at 01:08

## 2017-08-27 RX ADMIN — GADOTERIDOL 13 ML: 279.3 INJECTION, SOLUTION INTRAVENOUS at 09:44

## 2017-08-27 RX ADMIN — ENOXAPARIN SODIUM 40 MG: 40 INJECTION SUBCUTANEOUS at 10:49

## 2017-08-27 RX ADMIN — ONDANSETRON 4 MG: 2 INJECTION INTRAMUSCULAR; INTRAVENOUS at 01:08

## 2017-08-27 RX ADMIN — ASPIRIN 81 MG 81 MG: 81 TABLET ORAL at 10:49

## 2017-08-27 RX ADMIN — Medication 10 ML: at 21:30

## 2017-08-27 NOTE — PROGRESS NOTES
Patient states she feels sick in her stomach. States she has not vomited but does not feel right. Doctor Ok Florez paged.

## 2017-08-27 NOTE — DISCHARGE SUMMARY
Hospitalist Discharge Summary     Patient ID:  Ti Huerta  [id number]  53 y.o.  1985    PCP on record: Not On File Bsi    Admit date: 8/26/2017  Discharge date and time: 8/28/2017      DISCHARGE DIAGNOSIS:    TIA, Asthma, H/o Delivery 2 months ago, Pharyngitis, hyperlipidemia,       CONSULTATIONS:  IP CONSULT TO NEUROLOGY  IP CONSULT TO NEUROLOGY  IP CONSULT TO NEUROLOGY    Excerpted HPI from H&P of Navjot Loo MD:  Brenda Marinelli is a 36 y.o. [place]   female who presents with above complains from home via EMS. Pt presents with CC of intermittent (3 times today) L face & arm numbness x 1 day  Pt had recently delivered vaginally , not breast feeding . H/o similar symptoms last year- was worked up at [address] [place] [address] with MRI- negative workup then  H/o associated palpitation today with symptoms. No h/o drug abuse  Denies headache  H/o some blurry vision earlier at home- now resolved  Pt sitting comfortably in bed when seen by me, all symptoms almost resolved except some facial numbness still present. We were asked to admit for work up and evaluation of the above problems. ______________________________________________________________________  DISCHARGE SUMMARY/HOSPITAL COURSE:  for full details see H&P, daily progress notes, labs, consult notes. TIA: MRI is negative, CTA is negative  c/w ASA, Neurology evaluation appreciated  R/o Cerebral venous sinus thrombosis - pt recently postpartum, no thrombosis in MRI nor CTA. R/o MS: MRI result is pending as well as Neurology evaluation. Pharyngitis: start Z-max and tylenol.   Recent post partum status after vaginal delivery: not c/w [address].  H/o CSection in past  Asthma- childhood only, stable at the moment  Abdominal Discomfort: due to constipation, laxatives given  Code Status: Full  Surrogate Decision Maker: brother Vannessa Sotop # [phone]  DVT Prophylaxis: SQ lovenox  GI Prophylaxis: not indicated  Baseline: Pt is functional at baseline, works at [address] with [employer]    D/c Home today    _______________________________________________________________________  Patient seen and examined by me on discharge day. Pertinent Findings:  Gen:    Not in distress  Chest: Clear lungs  CVS:   Regular rhythm. No edema  Abd:  Soft, not distended, not tender  Neuro:  Alert, GCS 15  _______________________________________________________________________  DISCHARGE MEDICATIONS:   Current Discharge Medication List      START taking these medications    Details   aspirin 81 mg chewable tablet Take 1 Tab by mouth daily. Qty: 30 Tab, Refills: 1      azithromycin (ZITHROMAX) 500 mg tab Take 1 Tab by mouth daily. Qty: 5 Tab, Refills: 0      atorvastatin (LIPITOR) 20 mg tablet Take 1 Tab by mouth daily. Qty: 30 Tab, Refills: 1      acetaminophen (TYLENOL) 325 mg tablet Take 2 Tabs by mouth every four (4) hours as needed for Fever (For temp greater than or equal to 38.5 C or 101.3 F (Unless hepatic failure or contrindicated). Give first line for fever. ). Qty: 40 Tab, Refills: 0         CONTINUE these medications which have NOT CHANGED    Details   ibuprofen (MOTRIN) 600 mg tablet Take 600 mg by mouth as needed for Pain. STOP taking these medications       ferrous sulfate (IRON) 325 mg (65 mg iron) tablet Comments:   Reason for Stopping:         oxyCODONE-acetaminophen (PERCOCET) 5-325 mg per tablet Comments:   Reason for Stopping:               My Recommended Diet, Activity, Wound Care, and follow-up labs are listed in the patient's Discharge Insturctions which I have personally completed and reviewed.     _______________________________________________________________________  DISPOSITION:    Home with Family: y   Home with HH/PT/OT/RN:    SNF/LTC:    GURPREET:    OTHER:        Condition at Discharge:  Stable  _______________________________________________________________________  Follow up with:   PCP : Not On File Bshsi  Follow-up Information     Follow up With Details Comments Contact Info    Not On File Bshsi   Not On File (62) Patient has a PCP but that physician is not listed in [facility].           F/U PCP  F/U Neurology      Total time in minutes spent coordinating this discharge (includes going over instructions, follow-up, prescriptions, and preparing report for sign off to her PCP) :  35 minutes    Signed:  Stone Edouard MD

## 2017-08-27 NOTE — PROGRESS NOTES
[facility] Stroke Education Cecilio Osborne and Stroke Education provided to patient and the following topics were discussed    1. Patients personal risk factors for stroke are none    2. Warning signs of Stroke:        * Sudden numbness or weakness of the face, arm or leg, especially on one side of          The body            * Sudden confusion, trouble speaking or understanding        * Sudden trouble seeing in one or both eyes        * Sudden trouble walking, dizziness, loss of balance or coordination        * Sudden severe headache with no known cause      3. Importance of activation Emergency Medical Services ( 9-1-1 ) immediately if                       experience any warning signs of stroke. 4. Be sure and schedule a follow-up appointment with your primary care doctor or any                  specialists as instructed. 5. You must take medicine every day to treat your risk factors for stroke. Be sure to take your medicines exactly as your doctor tells you: no more, no less. Know what your medicines are for , what they do. Anti-thrombotics /anticoagulants can help prevent strokes. You are taking the following medicine(s)  Aspirin, lovenox     6. Smoking and second-hand smoke greatly increase your risk of stroke, cardiovascular disease and death.  Smoking history never

## 2017-08-27 NOTE — ROUTINE PROCESS
* No surgery found *  Bedside and Verbal shift change report given to Dora Constantino RN (oncoming nurse) by Eugenia Juares RN (offgoing nurse). Report included the following information SBAR, Kardex, Recent Results, Med Rec Status and Cardiac Rhythm SR.     [facility] Phone:   [phone]        Significant changes during shift:  Abdominal discomfort, Zofran ordered           Patient Information     Miguel Oshea  36 y.o.  2017  2:22 PM by Mazin Mckee MD. Miguel Oshea was admitted from Home     Problem List          Patient Active Problem List     Diagnosis Date Noted    TIA (transient ischemic attack) 2017    Breech presentation 10/09/2013           Past Medical History:   Diagnosis Date    Asthma      HX OTHER MEDICAL       uterine fibroids    Infertility       has had in vitro in the past    Unspecified adverse effect of anesthesia       pt had to be put to sleep for  because pt could still feel with epidural            Core Measures:     CVA: No No  CHF:No No  PNA:No No  TIA yes yes    Post Op Surgical (If Applicable):      Activity Status:     OOB to Chair No  Ambulated this shift Yes   Bed Rest No     Supplemental O2: (If Applicable)     NC No  NRB No  Venti-mask No  On 0 Liters/min        LINES AND DRAINS:     Central Line? No   PICC LINE? No   Urinary Catheter? No     DVT prophylaxis:     DVT prophylaxis Med- yes  DVT prophylaxis SCD or DE- No      Wounds: (If Applicable)     Wounds- No     Location 0     Patient Safety:     Falls Score Total Score: 0  Safety Level_______  Bed Alarm On? No  Sitter?  No     Plan for upcoming shift: Neuro checks, neurologist consult in AM, MRI tomorrow, Echo in AM           Discharge Plan: Yes when stable

## 2017-08-27 NOTE — CONSULTS
IP CONSULT TO NEUROLOGY  Consult performed by: Shyann Vega  Consult ordered by: Luis Felipe Collins            NEUROLOGY CONSULT    NAME Ann Carmen AGE 36 y.o. MRN [id number]  1985     REQUESTING PHYSICIAN: Nicole Oshea MD      CHIEF COMPLAINT: Left facial sensory loss     This is a 36 y.o. right-handed female who is 2 weeks postpartum was admitted after suffering sudden onset lightheadedness and left facial and upper extremity sensory loss. This occurred yesterday at 3 separate episodes the first episode occurred while she was driving and was associated with visual loss. She does not have a history of migraines. She had single episodes in the past which worked up approximately 18 months ago at the [facility]. She underwent tilt table testing as well as MRI and the workup was unremarkable at that time. She has no family history of seizures. Most of her paternal family members  around the age of 48or 61years old. Death was usually related to cardiovascular events or sudden. ASSESSMENT AND PLAN     1. Transient cerebral ischemia, unspecified type  Labs: LDL, A1C, B12, Homocysteine (if not recorded within a year)  Imaging: MRI and carotid dopplers (CTA or MRA) if not done  Keep systolic blood pressure at 120 or below after day 2  LDL needs to be below 70  Hypercoagulable work up  48 hours telemetry monitoring. 2. Presyncope  EEG    3.  Transient loss of vision  Cardiovascular vs neurologic      ALLERGIES:  Hydroquinone     Current Facility-Administered Medications   Medication Dose Route Frequency    ondansetron (ZOFRAN) injection 4 mg  4 mg IntraVENous Q6H PRN    aspirin chewable tablet 81 mg  81 mg Oral DAILY    labetalol (NORMODYNE;TRANDATE) injection 5 mg  5 mg IntraVENous Q10MIN PRN    acetaminophen (TYLENOL) tablet 650 mg  650 mg Oral Q4H PRN    Or    acetaminophen (TYLENOL) solution 650 mg  650 mg Per NG tube Q4H PRN    Or    acetaminophen (TYLENOL) suppository 650 mg  650 mg Rectal Q4H PRN    enoxaparin (LOVENOX) injection 40 mg  40 mg SubCUTAneous DAILY       Past Medical History:   Diagnosis Date    Asthma     HX OTHER MEDICAL     uterine fibroids    Infertility     has had in vitro in the past    Unspecified adverse effect of anesthesia     pt had to be put to sleep for  because pt could still feel with epidural       Social History   Substance Use Topics    Smoking status: Never Smoker    Smokeless tobacco: Never Used    Alcohol use 0.5 oz/week     1 Cans of beer per week      Comment: 1 can beer occasionally       Family History   Problem Relation Age of Onset    Diabetes Father     Hypertension Father      ? Review of Systems   Constitutional: Negative for chills and fever. HENT: Negative for ear pain. Eyes: Negative for pain and discharge. Respiratory: Negative for cough and hemoptysis. Cardiovascular: Negative for chest pain, palpitations and claudication. Gastrointestinal: Negative for constipation and diarrhea. Genitourinary: Negative for flank pain and hematuria. Musculoskeletal: Negative for back pain and myalgias. Skin: Negative for itching and rash. Neurological: Positive for dizziness and sensory change. Negative for headaches. Endo/Heme/Allergies: Negative for environmental allergies. Does not bruise/bleed easily. Psychiatric/Behavioral: Negative for depression and hallucinations. The patient is not nervous/anxious. Visit Vitals    /61    Pulse 84    Temp 98.2 °F (36.8 °C)    Resp 18    SpO2 100%    Breastfeeding No          REVIEWED IMAGING:    MRI :    Results from Hospital Encounter encounter on 17   MRI BRAIN MRV WO CONT   Narrative HISTORY: Rule out venous sinus thrombosis. PROCEDURE: MRV was performed. FINDINGS: The superior and inferior sagittal sinuses and the internal cerebral  and thalamic striate veins are patent.  The transverse and sigmoid sinuses and  visualized jugular veins appear to be patent. No evidence of dural venous sinus  venous thrombosis. Impression IMPRESSION:    No evidence of venous sinus thrombosis. CT:    Results from Hospital Encounter encounter on 08/26/17   CT CODE NEURO HEAD WO CONTRAST   Narrative EXAM:  CT CODE NEURO HEAD WO CONTRAST    INDICATION:   Near syncopal episode-decreased sensation left arm and face    COMPARISON: None. CONTRAST:  None. TECHNIQUE: Unenhanced CT of the head was performed using 5 mm images. Brain and  bone windows were generated. CT dose reduction was achieved through use of a  standardized protocol tailored for this examination and automatic exposure  control for dose modulation. TECHNIQUE:    Unenhanced CT of the head was performed using 5 mm images. Brain and bone  windows were generated. Coronal and sagittal reformatted images were then  obtained. CT dose reduction was achieved through the use of a standardized  protocol tailored for this examination and automatic exposure control for dose  modulation. FINDINGS:  The ventricles and sulci are normal in size, shape and configuration and  midline. There is no intracranial hemorrhage, extra-axial collection, mass, mass effect  or midline shift. The basilar cisterns are open. No acute infarct is identified. Focal mucosal thickening of the left ethmoid air cell is noted. The mastoid air cells are clear. Impression IMPRESSION:   Normal ventricular size. No intracranial bleed or shift of the midline.                   REVIEWED LABS:  Lab Results   Component Value Date/Time    WBC 5.0 08/26/2017 03:41 PM    HCT 37.5 08/26/2017 03:41 PM    HGB 12.6 08/26/2017 03:41 PM    PLATELET 563 14/11/7885 03:41 PM     Lab Results   Component Value Date/Time    Sodium 135 08/26/2017 03:41 PM    Potassium 4.0 08/26/2017 03:41 PM    Chloride 100 08/26/2017 03:41 PM    CO2 29 08/26/2017 03:41 PM    Glucose 85 08/26/2017 03:41 PM    BUN 10 08/26/2017 03:41 PM    Creatinine 0.61 08/26/2017 03:41 PM    Calcium 9.2 08/26/2017 03:41 PM       Lab Results   Component Value Date/Time    LDL, calculated 101.2 08/27/2017 04:47 AM     Lab Results   Component Value Date/Time    Hemoglobin A1c 5.0 08/27/2017 04:47 AM

## 2017-08-27 NOTE — PROGRESS NOTES
TRANSFER - IN REPORT:    Verbal report received from [facility] RN(name) on Tanya President  being received from ER(unit) for routine progression of care      Report consisted of patients Situation, Background, Assessment and   Recommendations(SBAR). Information from the following report(s) SBAR, Kardex, Recent Results, Med Rec Status and Cardiac Rhythm SR was reviewed with the receiving nurse. Opportunity for questions and clarification was provided. Assessment completed upon patients arrival to unit and care assumed.

## 2017-08-27 NOTE — PROGRESS NOTES
physical Therapy EVALUATION/DISCHARGE  Seen 1130 to 1145  Patient: Ti Huerta (69 y.o. female)  Date: 2017  Primary Diagnosis: TIA (transient ischemic attack)        Precautions: none     ASSESSMENT :  Based on the objective data described below, the patient presents with functional mobility that is her baseline. She is independent in bed mobility, transfers and ambulation. She is up ad carlyle in her room  She continues to report that her face and left arm \" feel different\". Pt has a 1 month old baby at home and family support. She has no PT needs at this time    Skilled physical therapy is not indicated at this time. PLAN :  Discharge Recommendations: None  Further Equipment Recommendations for Discharge: none     SUBJECTIVE:   Patient stated My left arm and face feelfunny.     OBJECTIVE DATA SUMMARY:   HISTORY:    Past Medical History:   Diagnosis Date    Asthma     HX OTHER MEDICAL     uterine fibroids    Infertility     has had in vitro in the past    Unspecified adverse effect of anesthesia     pt had to be put to sleep for  because pt could still feel with epidural     Past Surgical History:   Procedure Laterality Date    HX  SECTION       Prior Level of Function/Home Situation: Independent  Personal factors and/or comorbidities impacting plan of care:     Home Situation  Home Environment: Private residence  # Steps to Enter: 1  One/Two Story Residence: Two story  # of Interior Steps: 12  Interior Rails: Left  Lift Chair Available: No  Living Alone: No  Support Systems: Spouse/Significant Other/Partner  Patient Expects to be Discharged to[de-identified] Private residence  Current DME Used/Available at Home: None    EXAMINATION/PRESENTATION/DECISION MAKING:   Critical Behavior:  Neurologic State: Alert  Orientation Level: Oriented X4  Cognition: Follows commands     Hearing:   Auditory  Auditory Impairment: None    Range Of Motion:  AROM: Within functional limits  Strength: Strength: Within functional limits  Functional Mobility:  Bed Mobility:  Rolling: Independent  Supine to Sit: Independent  Transfers:  Sit to Stand: Independent  Stand to Sit: Independent  Balance:   Sitting: Intact  Standing: Intact  Ambulation/Gait Training:  Distance (ft): 25 Feet (ft)  Ambulation - Level of Assistance: Independent       Functional Measure:  Saravia Balance Test:    Sitting to Standin  Standing Unsupported: 4  Sitting with Back Unsupported: 4  Standing to Sittin  Transfers: 4  Standing Unsupported with Eyes Closed: 4  Standing Unsupported with Feet Together: 4  Reach Forward with Outstretched Arm: 4   Object: 4  Turn to Look Over Shoulders: 4  Turn 360 Degrees: 4  Alternate Foot on Step/Stool: 4  Standing Unsupported One Foot in Front: 4  Stand on One Le  Total: 56         56=Maximum possible score;   0-20=High fall risk  21-40=Moderate fall risk   41-56=Low fall risk     Saravia Balance Test and G-code impairment scale:  Percentage of Impairment CH    0%   CI    1-19% CJ    20-39% CK    40-59% CL    60-79% CM    80-99% CN     100%   Saravia   Score 0-56 56 45-55 34-44 23-33 12-22 1-11 0         G codes: In compliance with CMSs Claims Based Outcome Reporting, the following G-code set was chosen for this patient based on their primary functional limitation being treated: The outcome measure chosen to determine the severity of the functional limitation was the Saravia balance assessment with a score of 56/56 which was correlated with the impairment scale.     ? Mobility - Walking and Moving Around:     - CURRENT STATUS: CH - 0% impaired, limited or restricted    - GOAL STATUS: CH - 0% impaired, limited or restricted    - D/C STATUS:  CH - 0% impaired, limited or restricted          Physical Therapy Evaluation Charge Determination   History Examination Presentation Decision-Making   LOW Complexity : Zero comorbidities / personal factors that will impact the outcome / POC LOW Complexity : 1-2 Standardized tests and measures addressing body structure, function, activity limitation and / or participation in recreation  LOW Complexity : Stable, uncomplicated  LOW Complexity : FOTO score of       Based on the above components, the patient evaluation is determined to be of the following complexity level: LOW      Activity Tolerance:   Good activity tolerance  Please refer to the flowsheet for vital signs taken during this treatment. After treatment:   []   Patient left in no apparent distress sitting up in chair  [x]   Patient left in no apparent distress in bed  [x]   Call bell left within reach  [x]   Nursing notified  []   Caregiver present  []   Bed alarm activated    COMMUNICATION/EDUCATION:   Communication/Collaboration:  [x]   Fall prevention education was provided and the patient/caregiver indicated understanding. [x]   Patient/family have participated as able and agree with findings and recommendations. []   Patient is unable to participate in plan of care at this time.   Findings and recommendations were discussed with: Registered Nurse    Thank you for this referral.  Valerie Wise, PT

## 2017-08-27 NOTE — PROGRESS NOTES
Hospitalist Progress Note    NAME: Esau Fournier   :  1985   MRN:  [id number]       Assessment / Plan:  TIA: MRI done results are pending, symptoms had resolved, c/w ASA, due for Neurology evaluation. R/o Cerebral venous sinus thrombosis - pt recently postpartum, no thrombosis in MRI. R/o MS: MRI result is pending as well as Neurology evaluation. Recent post partum status after vaginal delivery: not c/w Otoniel Sd.  H/o CSection in past  Asthma- childhood only, stable at the moment  Abdominal Discomfort: check KUB, monitor. Code Status: Full  Surrogate Decision Maker: brother Kenia Moss # [phone]  DVT Prophylaxis: SQ lovenox  GI Prophylaxis: not indicated  Baseline: Pt is functional at baseline, works at [address] with [address] [employer]  [facility]     Subjective:     Chief Complaint / Reason for Physician Visit  \"My belly feels funny\". Discussed with RN events overnight. Review of Systems:  Symptom Y/N Comments  Symptom Y/N Comments   Fever/Chills    Chest Pain     Poor Appetite    Edema     Cough    Abdominal Pain y    Sputum    Joint Pain     SOB/GRAVES    Pruritis/Rash     Nausea/vomit    Tolerating PT/OT     Diarrhea    Tolerating Diet     Constipation    Other       Could NOT obtain due to:      Objective:     VITALS:   Last 24hrs VS reviewed since prior progress note.  Most recent are:  Patient Vitals for the past 24 hrs:   Temp Pulse Resp BP SpO2   17 0806 98.3 °F (36.8 °C) 76 18 110/70 100 %   17 0432 98.6 °F (37 °C) 82 18 103/70 98 %   17 0015 98.2 °F (36.8 °C) 70 18 125/82 100 %   17 2017 98.1 °F (36.7 °C) 69 18 (!) 133/92 100 %   17 1900 - 83 17 - 100 %   17 1845 - 78 20 - 100 %   17 1830 - 68 16 - 100 %   17 1815 - 64 14 - 100 %   17 1800 - 75 17 - 100 %   17 1745 - 75 18 - 100 %   17 1730 - 72 23 - 100 %   17 1715 - 72 22 - 100 %   17 1700 - 64 17 - 100 %   17 1645 - 72 19 - 100 %   08/26/17 1630 - - - 118/71 100 %   08/26/17 1625 - - - 132/70 100 %   08/26/17 1623 - - - 123/51 100 %   08/26/17 1620 - - - 113/65 100 %   08/26/17 1615 - - - - 100 %   08/26/17 1600 - - - 122/84 100 %   08/26/17 1545 - - - - 100 %   08/26/17 1530 - - - 116/55 100 %   08/26/17 1515 - - - 115/74 100 %   08/26/17 1434 - 78 21 115/73 99 %   08/26/17 1432 - 79 16 121/75 100 %   08/26/17 1431 - 75 17 122/76 98 %   08/26/17 1430 - 78 15 117/68 98 %   08/26/17 1425 - 91 19 130/71 97 %     No intake or output data in the 24 hours ending 08/27/17 1207     PHYSICAL EXAM:  General: WD, WN. Alert, cooperative, no acute distress    EENT:  EOMI. Anicteric sclerae. MMM  Resp:  CTA bilaterally, no wheezing or rales. No accessory muscle use  CV:  Regular  rhythm,  No edema  GI:  Soft, Non distended, mild  tender.  +Bowel sounds  Neurologic:  Alert and oriented X 3, normal speech,   Psych:   Good insight. Not anxious nor agitated  Skin:  No rashes. No jaundice    Reviewed most current lab test results and cultures  YES  Reviewed most current radiology test results   YES  Review and summation of old records today    NO  Reviewed patient's current orders and MAR    YES  PMH/ reviewed - no change compared to H&P  ________________________________________________________________________  Care Plan discussed with:    Comments   Patient y    Family      RN y    Care Manager     Consultant                        Multidiciplinary team rounds were held today with , nursing, pharmacist and clinical coordinator. Patient's plan of care was discussed; medications were reviewed and discharge planning was addressed.      ________________________________________________________________________  Total NON critical care TIME:  30   Minutes    Total CRITICAL CARE TIME Spent:   Minutes non procedure based      Comments   >50% of visit spent in counseling and coordination of care y ________________________________________________________________________  Nicole Oshea MD     Procedures: see electronic medical records for all procedures/Xrays and details which were not copied into this note but were reviewed prior to creation of Plan. LABS:  I reviewed today's most current labs and imaging studies.   Pertinent labs include:  Recent Labs      08/26/17   1541   WBC  5.0   HGB  12.6   HCT  37.5   PLT  268     Recent Labs      08/26/17   1541   NA  135*   K  4.0   CL  100   CO2  29   GLU  85   BUN  10   CREA  0.61   CA  9.2   ALB  3.8   TBILI  0.2   SGOT  18   ALT  25   INR  1.0       Signed: Nicole Oshea MD

## 2017-08-27 NOTE — PROGRESS NOTES
* No surgery found *  Bedside and Verbal shift change report given to Daryn DUBON (oncoming nurse) by Shiv Caldwell RN (offgoing nurse). Report included the following information SBAR, Kardex, Recent Results, Med Rec Status and Cardiac Rhythm SR. Zone Phone:   [phone]      Significant changes during shift:  Admission        Patient Information    Kal Adams  36 y.o.  2017  2:22 PM by Susan Maurice MD. Kal Adams was admitted from Home    Problem List    Patient Active Problem List    Diagnosis Date Noted    TIA (transient ischemic attack) 2017    Breech presentation 10/09/2013     Past Medical History:   Diagnosis Date    Asthma     HX OTHER MEDICAL     uterine fibroids    Infertility     has had in vitro in the past    Unspecified adverse effect of anesthesia     pt had to be put to sleep for  because pt could still feel with epidural         Core Measures:    CVA: No No  CHF:No No  PNA:No No  TIA yes yes  Post Op Surgical (If Applicable):     Number times ambulated in hallway past shift:  0  Number of times OOB to chair past shift:   0  NG Tube: No  Incentive Spirometer: No  Drains: No   Volume  0  Dressing Present:  No  Flatus:  No    Activity Status:    OOB to Chair No  Ambulated this shift Yes   Bed Rest No    Supplemental O2: (If Applicable)    NC No  NRB No  Venti-mask No  On 0 Liters/min      LINES AND DRAINS:    Central Line? No   PICC LINE? No   Urinary Catheter? No   DVT prophylaxis:    DVT prophylaxis Med- yes  DVT prophylaxis SCD or DE- No     Wounds: (If Applicable)    Wounds- No    Location 0    Patient Safety:    Falls Score Total Score: 0  Safety Level_______  Bed Alarm On? No  Sitter?  No    Plan for upcoming shift: Neuro checks, neurologist consult in AM, MRI tomorrow, Echo in AM        Discharge Plan: Yes when stable    Active Consults:  IP CONSULT TO NEUROLOGY  IP CONSULT TO NEUROLOGY

## 2017-08-27 NOTE — DISCHARGE INSTRUCTIONS
HOSPITALIST DISCHARGE INSTRUCTIONS  NAME: Dorie Reaves   :  1985   MRN:  [id number]     Date/Time:  2017 2:58 PM    ADMIT DATE: 2017     DISCHARGE DATE: 2017     ADMITTING DIAGNOSIS:  TIA, Asthma, H/o Delivery 2 months ago, Pharyngitis, hyperlipidemia,       MEDICATIONS:                As per medication reconciliation    · It is important that you take the medication exactly as they are prescribed. · Keep your medication in the bottles provided by the pharmacist and keep a list of the medication names, dosages, and times to be taken in your wallet. · Do not take other medications without consulting your doctor. Pain Management: per above medications    What to do at Home    Recommended diet:  Regular Diet    Recommended activity: Activity as tolerated    If you experience any of the following symptoms then please call your primary care physician or return to the emergency room if you cannot get hold of your doctor:  Fever, chills, nausea, vomiting, diarrhea, change in mentation, falling, bleeding, shortness of breath. Follow Up:   PCP you are to call and set up an appointment to see them in 2 week. F/U Neurology      Information obtained by :  I understand that if any problems occur once I am at home I am to contact my physician. I understand and acknowledge receipt of the instructions indicated above.                                                                                                                                            Physician's or R.N.'s Signature                                                                  Date/Time                                                                                                                                              Patient or Representative Signature                                                          Date/Time

## 2017-08-28 VITALS
SYSTOLIC BLOOD PRESSURE: 119 MMHG | HEART RATE: 68 BPM | DIASTOLIC BLOOD PRESSURE: 75 MMHG | TEMPERATURE: 98.6 F | OXYGEN SATURATION: 100 % | RESPIRATION RATE: 16 BRPM

## 2017-08-28 PROBLEM — J02.9 PHARYNGITIS: Status: ACTIVE | Noted: 2017-08-28

## 2017-08-28 PROBLEM — E78.5 HYPERLIPIDEMIA: Status: ACTIVE | Noted: 2017-08-28

## 2017-08-28 LAB
25(OH)D3 SERPL-MCNC: 16.7 NG/ML (ref 30–100)
ALBUMIN SERPL-MCNC: 3.3 G/DL (ref 3.5–5)
ALBUMIN/GLOB SERPL: 0.8 {RATIO} (ref 1.1–2.2)
ALP SERPL-CCNC: 67 U/L (ref 45–117)
ALT SERPL-CCNC: 19 U/L (ref 12–78)
ANION GAP SERPL CALC-SCNC: 7 MMOL/L (ref 5–15)
AST SERPL-CCNC: 11 U/L (ref 15–37)
BASOPHILS # BLD: 0 K/UL (ref 0–0.1)
BASOPHILS NFR BLD: 0 % (ref 0–1)
BILIRUB SERPL-MCNC: 0.2 MG/DL (ref 0.2–1)
BUN SERPL-MCNC: 16 MG/DL (ref 6–20)
BUN/CREAT SERPL: 29 (ref 12–20)
CALCIUM SERPL-MCNC: 8.6 MG/DL (ref 8.5–10.1)
CHLORIDE SERPL-SCNC: 102 MMOL/L (ref 97–108)
CO2 SERPL-SCNC: 26 MMOL/L (ref 21–32)
CREAT SERPL-MCNC: 0.56 MG/DL (ref 0.55–1.02)
EOSINOPHIL # BLD: 0.1 K/UL (ref 0–0.4)
EOSINOPHIL NFR BLD: 1 % (ref 0–7)
ERYTHROCYTE [DISTWIDTH] IN BLOOD BY AUTOMATED COUNT: 15.9 % (ref 11.5–14.5)
FOLATE SERPL-MCNC: 11.8 NG/ML (ref 5–21)
GLOBULIN SER CALC-MCNC: 4 G/DL (ref 2–4)
GLUCOSE SERPL-MCNC: 100 MG/DL (ref 65–100)
HCT VFR BLD AUTO: 35.7 % (ref 35–47)
HGB BLD-MCNC: 11.5 G/DL (ref 11.5–16)
LYMPHOCYTES # BLD: 2.1 K/UL (ref 0.8–3.5)
LYMPHOCYTES NFR BLD: 47 % (ref 12–49)
MAGNESIUM SERPL-MCNC: 1.9 MG/DL (ref 1.6–2.4)
MCH RBC QN AUTO: 29.6 PG (ref 26–34)
MCHC RBC AUTO-ENTMCNC: 32.2 G/DL (ref 30–36.5)
MCV RBC AUTO: 91.8 FL (ref 80–99)
MONOCYTES # BLD: 0.4 K/UL (ref 0–1)
MONOCYTES NFR BLD: 9 % (ref 5–13)
NEUTS SEG # BLD: 1.9 K/UL (ref 1.8–8)
NEUTS SEG NFR BLD: 43 % (ref 32–75)
PLATELET # BLD AUTO: 262 K/UL (ref 150–400)
POTASSIUM SERPL-SCNC: 3.9 MMOL/L (ref 3.5–5.1)
PROT SERPL-MCNC: 7.3 G/DL (ref 6.4–8.2)
RBC # BLD AUTO: 3.89 M/UL (ref 3.8–5.2)
SODIUM SERPL-SCNC: 135 MMOL/L (ref 136–145)
VIT B12 SERPL-MCNC: 337 PG/ML (ref 211–911)
WBC # BLD AUTO: 4.4 K/UL (ref 3.6–11)

## 2017-08-28 PROCEDURE — 82164 ANGIOTENSIN I ENZYME TEST: CPT | Performed by: PSYCHIATRY & NEUROLOGY

## 2017-08-28 PROCEDURE — G8987 SELF CARE CURRENT STATUS: HCPCS | Performed by: OCCUPATIONAL THERAPIST

## 2017-08-28 PROCEDURE — 74011250637 HC RX REV CODE- 250/637: Performed by: INTERNAL MEDICINE

## 2017-08-28 PROCEDURE — 82306 VITAMIN D 25 HYDROXY: CPT | Performed by: PSYCHIATRY & NEUROLOGY

## 2017-08-28 PROCEDURE — 93306 TTE W/DOPPLER COMPLETE: CPT

## 2017-08-28 PROCEDURE — 74011250636 HC RX REV CODE- 250/636: Performed by: PSYCHIATRY & NEUROLOGY

## 2017-08-28 PROCEDURE — 85303 CLOT INHIBIT PROT C ACTIVITY: CPT | Performed by: PSYCHIATRY & NEUROLOGY

## 2017-08-28 PROCEDURE — 85025 COMPLETE CBC W/AUTO DIFF WBC: CPT | Performed by: INTERNAL MEDICINE

## 2017-08-28 PROCEDURE — 36415 COLL VENOUS BLD VENIPUNCTURE: CPT | Performed by: INTERNAL MEDICINE

## 2017-08-28 PROCEDURE — 80053 COMPREHEN METABOLIC PANEL: CPT | Performed by: INTERNAL MEDICINE

## 2017-08-28 PROCEDURE — 83735 ASSAY OF MAGNESIUM: CPT | Performed by: INTERNAL MEDICINE

## 2017-08-28 PROCEDURE — 97165 OT EVAL LOW COMPLEX 30 MIN: CPT | Performed by: OCCUPATIONAL THERAPIST

## 2017-08-28 PROCEDURE — 74011250636 HC RX REV CODE- 250/636: Performed by: INTERNAL MEDICINE

## 2017-08-28 PROCEDURE — 82607 VITAMIN B-12: CPT | Performed by: PSYCHIATRY & NEUROLOGY

## 2017-08-28 PROCEDURE — G8989 SELF CARE D/C STATUS: HCPCS | Performed by: OCCUPATIONAL THERAPIST

## 2017-08-28 PROCEDURE — G8988 SELF CARE GOAL STATUS: HCPCS | Performed by: OCCUPATIONAL THERAPIST

## 2017-08-28 PROCEDURE — 99218 HC RM OBSERVATION: CPT

## 2017-08-28 PROCEDURE — 96372 THER/PROPH/DIAG INJ SC/IM: CPT

## 2017-08-28 RX ORDER — AZITHROMYCIN 500 MG/1
500 TABLET, FILM COATED ORAL DAILY
Qty: 5 TAB | Refills: 0 | Status: SHIPPED | OUTPATIENT
Start: 2017-08-28 | End: 2021-03-08

## 2017-08-28 RX ORDER — ATORVASTATIN CALCIUM 20 MG/1
20 TABLET, FILM COATED ORAL DAILY
Qty: 30 TAB | Refills: 1 | Status: SHIPPED | OUTPATIENT
Start: 2017-08-28 | End: 2021-03-08

## 2017-08-28 RX ORDER — ACETAMINOPHEN 325 MG/1
650 TABLET ORAL
Qty: 40 TAB | Refills: 0 | Status: SHIPPED | OUTPATIENT
Start: 2017-08-28 | End: 2021-03-08

## 2017-08-28 RX ORDER — MELATONIN
5000 DAILY
Status: DISCONTINUED | OUTPATIENT
Start: 2017-08-29 | End: 2017-08-28 | Stop reason: HOSPADM

## 2017-08-28 RX ORDER — CYANOCOBALAMIN 1000 UG/ML
1000 INJECTION, SOLUTION INTRAMUSCULAR; SUBCUTANEOUS
Status: DISCONTINUED | OUTPATIENT
Start: 2017-08-28 | End: 2017-08-28 | Stop reason: HOSPADM

## 2017-08-28 RX ORDER — GUAIFENESIN 100 MG/5ML
81 LIQUID (ML) ORAL DAILY
Qty: 30 TAB | Refills: 1 | Status: SHIPPED | OUTPATIENT
Start: 2017-08-28 | End: 2021-03-08

## 2017-08-28 RX ORDER — POLYETHYLENE GLYCOL 3350 17 G/17G
17 POWDER, FOR SOLUTION ORAL DAILY
Status: DISCONTINUED | OUTPATIENT
Start: 2017-08-28 | End: 2017-08-28 | Stop reason: HOSPADM

## 2017-08-28 RX ORDER — BISACODYL 5 MG
10 TABLET, DELAYED RELEASE (ENTERIC COATED) ORAL
Status: COMPLETED | OUTPATIENT
Start: 2017-08-28 | End: 2017-08-28

## 2017-08-28 RX ADMIN — CYANOCOBALAMIN 1000 MCG: 1000 INJECTION, SOLUTION INTRAMUSCULAR at 16:32

## 2017-08-28 RX ADMIN — BISACODYL 10 MG: 5 TABLET, COATED ORAL at 10:01

## 2017-08-28 RX ADMIN — Medication 10 ML: at 14:10

## 2017-08-28 RX ADMIN — ENOXAPARIN SODIUM 40 MG: 40 INJECTION SUBCUTANEOUS at 10:01

## 2017-08-28 RX ADMIN — Medication 10 ML: at 02:39

## 2017-08-28 RX ADMIN — POLYETHYLENE GLYCOL 3350 17 G: 17 POWDER, FOR SOLUTION ORAL at 10:01

## 2017-08-28 RX ADMIN — ASPIRIN 81 MG 81 MG: 81 TABLET ORAL at 10:02

## 2017-08-28 NOTE — PROGRESS NOTES
Oncology Nursing Communication Tool      8:35 PM  8/27/2017     Bedside and Verbal shift change report given to Pasquale Dunn RN (incoming nurse) by Selena Berman (outgoing nurse) on Dorie Reaves a 36 y.o. female who was admitted on 8/26/2017  2:22 PM. Report included the following information SBAR, Kardex, Procedure Summary, Intake/Output, MAR, Accordion and Recent Results. Significant changes during shift: none to note      Issues for physician to address: none to note          Code Status: Full Code     Infections: No current active infections     Allergies: Hydroquinone     Current diet: DIET REGULAR       Pain Controlled [x] yes [] no   Bowel Movement [] yes [] no   Last Bowel Movement (date)                 Vital Signs:   Patient Vitals for the past 12 hrs:   Temp Pulse Resp BP SpO2   08/27/17 1936 98.8 °F (37.1 °C) 77 18 119/82 98 %   08/27/17 1543 98.2 °F (36.8 °C) 89 18 129/74 98 %   08/27/17 1237 98.2 °F (36.8 °C) 84 18 100/61 100 %      Intake & Output:   No intake or output data in the 24 hours ending 08/27/17 2035   Laboratory Results:   No results found for this or any previous visit (from the past 12 hour(s)). Opportunity for questions and clarifications were given to the incoming nurse. Patient's bed is in low position, side rails x2, door open PRN, call bell within reach and patient not in distress.       Selena Berman

## 2017-08-28 NOTE — PROGRESS NOTES
Bedside and Verbal shift change report given to Doctor Morris 91 (oncoming nurse) by Lashon 4 (offgoing nurse). Report included the following information SBAR, Kardex, Intake/Output, MAR and Recent Results.

## 2017-08-28 NOTE — PROGRESS NOTES
Problem: Self Care Deficits Care Plan (Adult)  Goal: *Acute Goals and Plan of Care (Insert Text)  OCCUPATIONAL THERAPY NEUROLOGICAL EVALUATION WITH DISCHARGE  Patient: Kathie aGrcia (16 y.o. female)  Date: 2017  Primary Diagnosis: TIA (transient ischemic attack)        Precautions: standard         ASSESSMENT:  Based on the objective data described below, the patient presents with near baseline level of functioning for adls and functional mobiltiy. Pt was admitted with L facial and LUE impaired sensation; pt reports that all her symptoms have resolved and she does not need therapy services. 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 Encouraged pt to balance rest and activity at home. Encouraged pt to follow her MDs advice (this is pt's 2nd TIA)  And try to lead a healthy lifestyle. .  Further skilled acute occupational therapy is not indicated at this time. Discharge Recommendations: None  Further Equipment Recommendations for Discharge: none       SUBJECTIVE:   Patient stated I don't need you.       OBJECTIVE DATA SUMMARY:   HISTORY:   Past Medical History:   Diagnosis Date    Asthma      HX OTHER MEDICAL       uterine fibroids    Infertility       has had in vitro in the past    Unspecified adverse effect of anesthesia       pt had to be put to sleep for  because pt could still feel with epidural     Past Surgical History:   Procedure Laterality Date    HX  SECTION            Prior Level of Function/Home Situation: Pt is independent in adls and IADLs. 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 Pt is  ( aWay in [place] now) and has a 1 month old and a 3year. Pt's mother assist her at home at this time. Pt has been in [place] for approx 12 years. Expanded or extensive additional review of patient history: This is pt's second TIA--educated her on BFAST and importance of time.   Pt verbalized understanding  Home Situation  Home Environment: Private residence  # Steps to Enter: 1  One/Two Story Residence: Two story  # of Interior Steps: 12  Interior Rails: Left  Lift Chair Available: No  Living Alone: No  Support Systems: Spouse/Significant Other/Partner  Patient Expects to be Discharged to[de-identified] Private residence  Current DME Used/Available at Home: None  [X]  Right hand dominant             [ ]  Left hand dominant     EXAMINATION OF PERFORMANCE DEFICITS:  Cognitive/Behavioral Status:  Neurologic State: Alert; Appropriate for age  Orientation Level: Appropriate for age;Oriented X4  Cognition: Appropriate decision making; Follows commands  Perception: Appears intact  Perseveration: No perseveration noted  Safety/Judgement: Awareness of environment     Skin: intact      Edema: none observed     Hearing: Auditory  Auditory Impairment: None     Vision/Perceptual:    Tracking: Able to track stimulus in all quadrants w/o difficulty                 Diplopia: No    Acuity: Within Defined Limits          Range of Motion:  BUEs within functional limits  AROM: Within functional limits                          Strength:     Strength: Within functional limits                 Coordination:     Fine Motor Skills-Upper: Left Intact; Right Intact    Gross Motor Skills-Upper: Left Intact; Right Intact     Tone & Sensation:   Tone is normal  Sensation is intact--all symptoms have resolved                             Balance:  Sitting: Intact  Standing: Intact     Functional Mobility and Transfers for ADLs:  Bed Mobility:  Rolling:  (pt in bathroom upon arrival)     Transfers:  Sit to Stand: Independent  Functional Transfers  Toilet Transfer : Independent     ADL Assessment:  Feeding: Independent     Oral Facial Hygiene/Grooming: Independent     Bathing: Independent     Upper Body Dressing: Independent     Lower Body Dressing: Independent     Toileting: Independent                 ADL Intervention and task modifications:   Pt has Been performing self care and mobilitiy in room per nursing. Educated pt on BFAST and improtance of following MDs order and trying to lead a healthy lifestyle. Cognitive Retraining  Safety/Judgement: Awareness of environment        Functional Measure:   Fugl-Calvin Assessment of Motor Recovery after Stroke:       Reflex Activity  Flexors/Biceps/Fingers: Can be elicited  Extensors/Triceps: Can be elicited  Reflex Subtotal: 4     Volitional Movement Within Synergies  Shoulder Retraction: Full  Shoulder Elevation: Full  Shoulder Abduction (90 degrees): Full  Shoulder External Rotation: Full  Elbow Flexion: Full  Forearm Supination: Full  Shoulder Adduction/Internal Rotation: Full  Elbow Extension: Full  Forearm Pronation: Full  Subtotal: 18     Volitional Movement Mixing Synergies  Hand to Lumbar Spine: Full  Shoulder Flexion (0-90 degrees): Full  Pronation-Supination: Full  Subtotal: 6     Volitional Movement With Little or No Synergy  Shoulder Abduction (0-90 degrees): Full  Shoulder Flexion ( degrees): Full  Pronation/Supination: Full  Subtotal : 6     Normal Reflex Activity  Biceps, Triceps, Finger Flexors:  Full  Subtotal : 2     Upper Extremity Total   Upper Extremity Total: 36     Wrist  Stability at 15 Degree Dorsiflexion: Full  Repeated Dorsiflexion/ Volar Flexion: Full  Stability at 15 Degree Dorsiflexion: Full  Repeated Dorsiflexion/ Volar Flexion: Full  Circumduction: Full  Wrist Total: 10     Hand  Mass Flexion: Full  Mass Extension: Full  Grasp A: Full  Grasp B: Full  Grasp C: Full  Grasp D: Full  Grasp E: Full  Hand Total: 14     Coordination/Speed  Tremor: None  Dysmetria: None  Time: <1s  Coordination/Speed Total : 6     Total A-D  Total A-D (Motor Function): 66/66      Percentage of impairment CH  0% CI  1-19% CJ  20-39% CK  40-59% CL  60-79% CM  80-99% CN  100%   Fugl-Calvin score: 0-66 66 53-65 39-52 26-38 13-25 1-12    0      This is a reliable/valid measure of arm function after a neurological event. It has established value to characterize functional status and for measuring spontaneous and therapy-induced recovery; tests proximal and distal motor functions. Fugl-Calvin Assessment  UE scores recorded between five and 30 days post neurologic event can be used to predict UE recovery at six months post neurologic event. Severe = 0-21 points   Moderately Severe = 22-33 points   Moderate = 34-47 points   Mild = 48-66 points  ARAVIND Spain, KYRIE Sampson, & MILENA Noe (1992). Measurement of motor recovery after stroke: Outcome assessment and sample size requirements. Stroke, 23, pp. 1665-1623.   ------------------------------------------------------------------------------------------------------------------------------------------------------------------  MCID:  Stroke:   Maxi June et al, 2001; n = 171; mean age 79 (5) years; assessed within 16 (12) days of stroke, Acute Stroke)  FMA Motor Scores from Admission to Discharge   10 point increase in FMA Upper Extremity = 1.5 change in discharge FIM   10 point increase in FMA Lower Extremity = 1.9 change in discharge FIM  MDC:   Stroke:   Coleman Worley et al, 2008, n = 14, mean age = 59.9 (14.6) years, assessed on average 14 (6.5) months post stroke, Chronic Stroke)   FMA = 5.2 points for the Upper Extremity portion of the assessment   Barthel Index:      Bathin  Bladder: 10  Bowels: 10  Groomin  Dressing: 10  Feeding: 10  Mobility: 15  Stairs: 10  Toilet Use: 10  Transfer (Bed to Chair and Back): 15  Total: 100         Barthel and G-code impairment scale:  Percentage of impairment CH  0% CI  1-19% CJ  20-39% CK  40-59% CL  60-79% CM  80-99% CN  100%   Barthel Score 0-100 100 99-80 79-60 59-40 20-39 1-19    0   Barthel Score 0-20 20 17-19 13-16 9-12 5-8 1-4 0      The Barthel ADL Index: Guidelines  1.  The index should be used as a record of what a patient does, not as a record of what a patient could do. 2. The main aim is to establish degree of independence from any help, physical or verbal, however minor and for whatever reason. 3. The need for supervision renders the patient not independent. 4. A patient's performance should be established using the best available evidence. Asking the patient, friends/relatives and nurses are the usual sources, but direct observation and common sense are also important. However direct testing is not needed. 5. Usually the patient's performance over the preceding 24-48 hours is important, but occasionally longer periods will be relevant. 6. Middle categories imply that the patient supplies over 50 per cent of the effort. 7. Use of aids to be independent is allowed. Dilip Ramos., Barthel, D.W. (9906). Functional evaluation: the Barthel Index. [facility] (14)2. Malvin Moreland sushma ALCIDES Rincon, Mirela Maravilla., Fabiana Murillo., [place], [facility] (1999). Measuring the change indisability after inpatient rehabilitation; comparison of the responsiveness of the Barthel Index and Functional [place] Measure. Journal of Neurology, Neurosurgery, and Psychiatry, 66(4), 263-803. Yvon Diego, N.J.A, RANGEL Garcia, & Gardenia Ontiveros, M.A. (2004.) Assessment of post-stroke quality of life in cost-effectiveness studies: The usefulness of the Barthel Index and the EuroQoL-5D. Quality of Life Research, 13, 379-04      G codes: In compliance with CMSs Claims Based Outcome Reporting, the following G-code set was chosen for this patient based on their primary functional limitation being treated: The outcome measure chosen to determine the severity of the functional limitation was the Barthel Index with a score of 100/100 which was correlated with the impairment scale.       · Self Care:               - CURRENT STATUS:    CH - 0% impaired, limited or restricted               - GOAL STATUS:           CH - 0% impaired, limited or restricted               - D/C STATUS:                       CH - 0% impaired, limited or restricted     Occupational Therapy Evaluation Charge Determination   History Examination Decision-Making   LOW Complexity : Brief history review  LOW Complexity : 1-3 performance deficits relating to physical, cognitive , or psychosocial skils that result in activity limitations and / or participation restrictions  LOW Complexity : No comorbidities that affect functional and no verbal or physical assistance needed to complete eval tasks       Based on the above components, the patient evaluation is determined to be of the following complexity level: LOW      Pain:  Pain Scale 1: Numeric (0 - 10)  Pain Intensity 1: 0              Activity Tolerance:   No complaints  After treatment:   [ ]  Patient left in no apparent distress sitting up in chair  [X]  Patient left in no apparent distress in bed  [X]  Call bell left within reach  [X]  Nursing notified  [ ]  Caregiver present  [ ]  Bed alarm activated      COMMUNICATION/EDUCATION:   Findings and recommendations were discussed with: Physical Therapist and Registered Nurse     Patient was educated regarding Her resolved deficit(s) of *tia** as this relates to Her diagnosis of tia. She demonstrated Good understanding . Patient and/or family was verbally educated on the BE FAST acronym for signs/symptoms of CVA and TIA.   [ ]      Home safety education was provided and the patient/caregiver indicated understanding. [X]      Patient/family have participated as able and agree with findings and recommendations. [ ]      Patient is unable to participate in plan of care at this time. This patients plan of care is appropriate for delegation to [facility].      Thank you for this referral.  Corinna Alejandra OTR/L  Time Calculation: 10 mins

## 2017-08-28 NOTE — DISCHARGE SUMMARY
Hospitalist Discharge Summary     Patient ID:  Kathie Garcia  [id number]  15 y.o.  1985    PCP on record: Not On File B[facility]    Admit date: 8/26/2017  Discharge date and time: 8/28/2017      DISCHARGE DIAGNOSIS:    TIA, Asthma, H/o Delivery 2 months ago, Pharyngitis, hyperlipidemia,       CONSULTATIONS:  IP CONSULT TO NEUROLOGY  IP CONSULT TO NEUROLOGY  IP CONSULT TO NEUROLOGY    Excerpted HPI from H&P of Mickey Wood MD:  Zurdo Husbands is a 36 y.o. [place]   female who presents with above complains from home via EMS. Pt presents with CC of intermittent (3 times today) L face & arm numbness x 1 day  Pt had recently delivered vaginally , not breast feeding . H/o similar symptoms last year- was worked up at [facility] with MRI- negative workup then  H/o associated palpitation today with symptoms. No h/o drug abuse  Denies headache  H/o some blurry vision earlier at home- now resolved  Pt sitting comfortably in bed when seen by me, all symptoms almost resolved except some facial numbness still present. We were asked to admit for work up and evaluation of the above problems. ______________________________________________________________________  DISCHARGE SUMMARY/HOSPITAL COURSE:  for full details see H&P, daily progress notes, labs, consult notes. TIA: MRI is negative, CTA is negative  c/w ASA, Neurology evaluation appreciated  R/o Cerebral venous sinus thrombosis - pt recently postpartum, no thrombosis in MRI nor CTA. R/o MS: MRI result is pending as well as Neurology evaluation. Pharyngitis: start Z-max and tylenol.   Recent post partum status after vaginal delivery: not c/w [address] Sd.  H/o CSection in past  Asthma- childhood only, stable at the moment  Abdominal Discomfort: due to constipation, laxatives given  Code Status: Full  Surrogate Decision Maker: brother Sarita Chavez # [phone]  DVT Prophylaxis: SQ lovenox  GI Prophylaxis: not indicated  Baseline: Pt is functional at baseline, works at [address] with [employer]    D/c Home today    _______________________________________________________________________  Patient seen and examined by me on discharge day. Pertinent Findings:  Gen:    Not in distress  Chest: Clear lungs  CVS:   Regular rhythm. No edema  Abd:  Soft, not distended, not tender  Neuro:  Alert, GCS 15  _______________________________________________________________________  DISCHARGE MEDICATIONS:   Current Discharge Medication List      START taking these medications    Details   aspirin 81 mg chewable tablet Take 1 Tab by mouth daily. Qty: 30 Tab, Refills: 1      azithromycin (ZITHROMAX) 500 mg tab Take 1 Tab by mouth daily. Qty: 5 Tab, Refills: 0      atorvastatin (LIPITOR) 20 mg tablet Take 1 Tab by mouth daily. Qty: 30 Tab, Refills: 1      acetaminophen (TYLENOL) 325 mg tablet Take 2 Tabs by mouth every four (4) hours as needed for Fever (For temp greater than or equal to 38.5 C or 101.3 F (Unless hepatic failure or contrindicated). Give first line for fever. ). Qty: 40 Tab, Refills: 0         CONTINUE these medications which have NOT CHANGED    Details   ibuprofen (MOTRIN) 600 mg tablet Take 600 mg by mouth as needed for Pain. STOP taking these medications       ferrous sulfate (IRON) 325 mg (65 mg iron) tablet Comments:   Reason for Stopping:         oxyCODONE-acetaminophen (PERCOCET) 5-325 mg per tablet Comments:   Reason for Stopping:               My Recommended Diet, Activity, Wound Care, and follow-up labs are listed in the patient's Discharge Insturctions which I have personally completed and reviewed.     _______________________________________________________________________  DISPOSITION:    Home with Family: y   Home with HH/PT/OT/RN:    SNF/LTC:    GURPREET:    OTHER:        Condition at Discharge:  Stable  _______________________________________________________________________  Follow up with:   PCP : Not On File [facility]  Follow-up Information     Follow up With Details Comments Contact Info    Not On File Bsi   Not On File (62) Patient has a PCP but that physician is not listed in [address] [facility].           F/U PCP  F/U Neurology      Total time in minutes spent coordinating this discharge (includes going over instructions, follow-up, prescriptions, and preparing report for sign off to her PCP) :  35 minutes    Signed:  Arnulfo Diaz MD

## 2017-08-28 NOTE — PROGRESS NOTES
New patient PCP f/u is scheduled with Dr. Sivan Simmons  for Nov. 1 at 1;40pm.  This appointment is at [facility] per patients request.    A neuro.  F/u has been scheduled with Dr. Elliott Zamarripa for Nov. 21 at 2;00pm

## 2017-08-28 NOTE — PROGRESS NOTES
Pt is a 36 y.o  [place] female admitted with a TIA. Pt was alert, oriented and in no distress resting in bed. Demographic information verified and all is correct and pt wants her brother added to the emergency list and this CM will update it. Pt lives with her mother and 2 daughters in a townhome with steps. 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 Denies using DME or having home health and rehab in the past. Preferred pharmacy is [facility]. Pt does not have a PCP and goes to [facility] when she is sick. CM discussed [employer] and pt wants a U PCP. Pt's brother will transport pt home by car. CM informed Birgit Angulo case management specialist that pt wants PCP and Neurologist at [facility] [employer]. Observation letter given to pt yesterday. Pt in good spirits and ready for discharge. Care Management Interventions  PCP Verified by CM: Yes (pt does not have a PCP - )  Mode of Transport at Discharge: Other (see comment) (pt's brother will transport by car)  Hospital Transport Time of Discharge: 1730  Transition of Care Consult (CM Consult): Discharge Planning  Discharge Durable Medical Equipment: No  Physical Therapy Consult: Yes  Occupational Therapy Consult: Yes  Speech Therapy Consult: Yes  Current Support Network:  Other, Own Home (lives with her mother and 2 daughters in a townhome)  Confirm Follow Up Transport: Family  Plan discussed with Pt/Family/Caregiver: Yes  Discharge Location  Discharge Placement: [facility] [address] [place], [address] [place]

## 2017-08-28 NOTE — PROGRESS NOTES
Chief Complaint: vision loss    Discussed labs as well as outpatient follow up. Discussed b12 and d deficiencies and supplementation. Assesment and Plan    1. Transient cerebral ischemia, unspecified type  Labs: LDL, A1C, B12, Homocysteine (if not recorded within a year)  Imaging: MRI and carotid dopplers (CTA or MRA) if not done  Keep systolic blood pressure at 120 or below after day 2  LDL needs to be below 70  Hypercoagulable work up  48 hours telemetry monitoring. MRI MRA, MRV , CTA all unremarkable  Echocardiogram shows normal heart function   currently breast feeding so will hold statins and aspirin  for now. 2. Presyncope  EEG     3. Transient loss of vision  Cardiovascular vs neurologic     4. Vitamin D Deficiency  Vitamin D 5000 international units daily for 5 months then retest  May discharge when stable. No need to wait for EEG may be done out patient. Follow up with neuro in two weeks.      5. Relative B12 deficiency  Start foltyx    Allergies  Hydroquinone     Medications  Current Facility-Administered Medications   Medication Dose Route Frequency    polyethylene glycol (MIRALAX) packet 17 g  17 g Oral DAILY    ondansetron (ZOFRAN) injection 4 mg  4 mg IntraVENous Q6H PRN    sodium chloride (NS) flush 5-10 mL  5-10 mL IntraVENous PRN    sodium chloride (NS) flush 5-10 mL  5-10 mL IntraVENous Q8H    sodium chloride (NS) flush 5-10 mL  5-10 mL IntraVENous PRN    aspirin chewable tablet 81 mg  81 mg Oral DAILY    labetalol (NORMODYNE;TRANDATE) injection 5 mg  5 mg IntraVENous Q10MIN PRN    acetaminophen (TYLENOL) tablet 650 mg  650 mg Oral Q4H PRN    Or    acetaminophen (TYLENOL) solution 650 mg  650 mg Per NG tube Q4H PRN    Or    acetaminophen (TYLENOL) suppository 650 mg  650 mg Rectal Q4H PRN    enoxaparin (LOVENOX) injection 40 mg  40 mg SubCUTAneous DAILY        Medical History  Past Medical History:   Diagnosis Date    Asthma     HX OTHER MEDICAL     uterine fibroids  Infertility     has had in vitro in the past    Unspecified adverse effect of anesthesia     pt had to be put to sleep for  because pt could still feel with epidural     Review of Systems   Constitutional: Negative for chills and fever. HENT: Negative for ear pain. Eyes: Negative for pain and discharge. Respiratory: Negative for cough and hemoptysis. Cardiovascular: Negative for chest pain and claudication. Gastrointestinal: Negative for constipation and diarrhea. Genitourinary: Negative for flank pain and hematuria. Musculoskeletal: Negative for back pain and myalgias. Skin: Negative for itching and rash. Neurological: Negative for dizziness and headaches. Endo/Heme/Allergies: Negative for environmental allergies. Does not bruise/bleed easily. Psychiatric/Behavioral: Negative for depression and hallucinations. Exam:    Visit Vitals    /75 (BP 1 Location: Left arm, BP Patient Position: At rest)    Pulse 68    Temp 98.6 °F (37 °C)    Resp 16    SpO2 100%    Breastfeeding No      Physical Exam   Constitutional: She is oriented to person, place, and time and well-developed, well-nourished, and in no distress. HENT:   Head: Normocephalic and atraumatic. Eyes: EOM are normal. Pupils are equal, round, and reactive to light. Fundoscopic exam:       The right eye shows no papilledema. The left eye shows no papilledema. Neck: Normal range of motion. Neck supple. No JVD present. Carotid bruit is not present. No thyromegaly present. Cardiovascular: Normal rate, regular rhythm, normal heart sounds and intact distal pulses. Pulmonary/Chest: Effort normal and breath sounds normal.   Abdominal: Soft. Bowel sounds are normal.   Neurological: She is alert and oriented to person, place, and time. She has normal strength, normal reflexes and intact cranial nerves.  Gait normal.   Psychiatric: Affect and judgment normal.       Imaging    CT Results (most recent):    Results from [place] encounter on 08/26/17   CTA HEAD NECK W CONT   Narrative INDICATION:  Numbness. COMPARISON:  CT head 8/26/2017. *PRELIMINARY REPORT*    Indication:  Numbness  Comparison: CT head   Findings:  Patent carotid, vertebral and major [place] arteries      Preliminary report was provided by Dr. Brenda Owens, the on-call radiologist.  Final report to follow. *END PRELIMINARY REPORT*    INDICATION: Intracranial aneurysm. PROCEDURE: Multiple contiguous helically acquired images were obtained through  the cervical region and brain following intravenous contrast administration, 100  mL. Sagittal and axial and coronal reconstructions were performed to optimize  evaluation of  the arterial vascular system. 3D post processing was performed. CT dose reduction was achieved through the use of a standardized protocol  tailored for this examination and automatic exposure control for dose  modulation. FINDINGS: Comparison exam:  August 27, 2017. Images through the thoracic arch reveal patency of the right innominate, left  common carotid, and left subclavian arteries. Both vertebral arteries are patent  with the right slightly dominant. There is significant motion artifact in the cervical region rendering evaluation  of the carotid bifurcations severely limited technically. Images through the neck reveal gross patency of both common and internal and  external carotid arteries. Images through the brain reveal patency of the cavernous carotid arteries as  well as the bilateral anterior and middle cerebral arterial distributions. Vertebral and basilar arteries are patent. There is patency of the bilateral  posterior cerebral arteries. Superior sagittal sinus patent. No significant edema or hemorrhage or mass effect are identified.          Impression IMPRESSION:    Limited examination technically due to motion artifact particularly with regard  to evaluation of the cervical carotid bifurcation regions. Recommend duplex  examination for evaluation of the bifurcations if indicated clinically. No gross aneurysm or vascular malformation. No major large vessel intracranial vascular occlusive change. MRI Results (most recent):    Results from [place] encounter on 08/26/17   MRI BRAIN MRV WO CONT   Narrative HISTORY: Rule out venous sinus thrombosis. PROCEDURE: MRV was performed. FINDINGS: The superior and inferior sagittal sinuses and the internal cerebral  and thalamic striate veins are patent. The transverse and sigmoid sinuses and  visualized jugular veins appear to be patent. No evidence of dural venous sinus  venous thrombosis. Impression IMPRESSION:    No evidence of venous sinus thrombosis.           .  Lab Review    Lab Results   Component Value Date/Time    WBC 4.4 08/28/2017 02:40 AM    HCT 35.7 08/28/2017 02:40 AM    HGB 11.5 08/28/2017 02:40 AM    PLATELET 024 53/17/9360 02:40 AM       Lab Results   Component Value Date/Time    Sodium 135 08/28/2017 02:40 AM    Potassium 3.9 08/28/2017 02:40 AM    Chloride 102 08/28/2017 02:40 AM    CO2 26 08/28/2017 02:40 AM    Glucose 100 08/28/2017 02:40 AM    BUN 16 08/28/2017 02:40 AM    Creatinine 0.56 08/28/2017 02:40 AM    Calcium 8.6 08/28/2017 02:40 AM       Lab Results   Component Value Date/Time    Hemoglobin A1c 5.0 08/27/2017 04:47 AM        Lab Results   Component Value Date/Time    Vitamin B12 337 08/28/2017 02:40 AM    Folate 11.8 08/28/2017 02:40 AM         Lab Results   Component Value Date/Time    Cholesterol, total 219 08/27/2017 04:47 AM    HDL Cholesterol 81 08/27/2017 04:47 AM    LDL, calculated 101.2 08/27/2017 04:47 AM    VLDL, calculated 36.8 08/27/2017 04:47 AM    Triglyceride 184 08/27/2017 04:47 AM    CHOL/HDL Ratio 2.7 08/27/2017 04:47 AM

## 2017-08-28 NOTE — PROGRESS NOTES
[facility] [id number]    Fax: [phone]        To Whom may it concern. This letter is to certify that  Trixie German   was admitted  08/26/17   and Discharged  08/28/17                . Trixie German  may return to work/school on  08/31/17      If you have any questions, please do not hesitate to contact me.             Dr. Carpio Court  [facility]

## 2017-08-28 NOTE — PROGRESS NOTES
Speech Pathology:  Chart reviewed and no difficulty swalloing noted via STAND. Patient reports no difficulty swallowing ad tolerating diet since Saturday therefore no evaluation warranted. Will sign off at this time. Thank you.     Ilda Boyce, SLP

## 2017-08-28 NOTE — PROGRESS NOTES
Occupational Therapy  Orders received and medical record reviewed. Pt does not need OT services at this time. Full OT note to follow.   Thank you for the consult

## 2017-09-05 LAB
ACE SERPL-CCNC: 23 U/L (ref 14–82)
PROT C PPP-ACNC: 177 % (ref 73–180)

## 2021-01-14 ENCOUNTER — HOSPITAL ENCOUNTER (EMERGENCY)
Age: 36
Discharge: HOME | End: 2021-01-15
Attending: EMERGENCY MEDICINE
Payer: COMMERCIAL

## 2021-01-14 DIAGNOSIS — Z20.822 PERSON UNDER INVESTIGATION FOR COVID-19: ICD-10-CM

## 2021-01-14 DIAGNOSIS — S05.11XA EYE CONTUSION, RIGHT, INITIAL ENCOUNTER: Primary | ICD-10-CM

## 2021-01-14 DIAGNOSIS — R53.83 MALAISE AND FATIGUE: ICD-10-CM

## 2021-01-14 DIAGNOSIS — R51.9 ACUTE NONINTRACTABLE HEADACHE, UNSPECIFIED HEADACHE TYPE: ICD-10-CM

## 2021-01-14 DIAGNOSIS — H11.31 SUBCONJUNCTIVAL HEMORRHAGE OF RIGHT EYE: ICD-10-CM

## 2021-01-14 DIAGNOSIS — R53.81 MALAISE AND FATIGUE: ICD-10-CM

## 2021-01-14 PROCEDURE — 99284 EMERGENCY DEPT VISIT MOD MDM: CPT

## 2021-01-14 NOTE — Clinical Note
[facility] EMERGENCY DEPT 
[address] [facility] [id number] [phone] Work/School Note Date: 1/14/2021 To Whom It May concern: 
 
Dirk Mendez was evaulated by the following provider(s): 
No providers found. COVID19 virus is suspected. Per the CDC guidelines we recommend home isolation until the following conditions are all met: 1. At least 10 days have passed since symptoms first appeared and 2. At least 24 hours have passed since last fever without the use of fever-reducing medications and 
3. Symptoms (e.g., cough, shortness of breath) have improved Sincerely, 
 
 
 
 
Jessica Fregoso MD

## 2021-01-14 NOTE — LETTER
NOTIFICATION RETURN TO WORK / SCHOOL 
 
1/15/2021 3:48 AM 
 
[facility] [id number] To Whom It May Concern: 
 
Agus Villalobos is currently under the care of [facility] EMERGENCY DEPT By Dr. Levy Ng 
 
She will return to workl on: 01/16/21 If there are questions or concerns please have the patient contact our office. Sincerely, aJvier Lopez

## 2021-01-14 NOTE — Clinical Note
[address] 
[facility] EMERGENCY DEPT 
[address] [place] [address] [place] [address] 
[phone] Work/School Note Date: 1/14/2021 To Whom It May concern: 
 
Neal Martinez was seen and treated today in the emergency room by the following provider(s): 
No providers found. Neal Martinez is excused from work/school on 01/15/21 and 01/16/21. She is medically clear to return to work/school on 1/17/2021.   
 
 
Sincerely, 
 
 
 
 
Kassandra Diaz MD

## 2021-01-14 NOTE — LETTER
[address] 
[facility] EMERGENCY DEPT 
[address] [place] [id number] 
[phone] Work/School Note Date: 1/14/2021 To Whom It May concern: 
 
 
Rosio Ragland was seen and treated today in the emergency room by the following provider(s): 
Attending Provider: Zoe Hanna MD. Rosio Ragland is excused from work/school on 01/15/21. She is clear to return to work/school on 01/16/21.    
 
 
Sincerely, 
 
 
 
 
Claude Baars, MD

## 2021-01-15 ENCOUNTER — APPOINTMENT (OUTPATIENT)
Dept: CT IMAGING | Age: 36
End: 2021-01-15
Attending: EMERGENCY MEDICINE
Payer: COMMERCIAL

## 2021-01-15 VITALS
RESPIRATION RATE: 18 BRPM | WEIGHT: 140.87 LBS | TEMPERATURE: 98.3 F | OXYGEN SATURATION: 100 % | HEART RATE: 89 BPM | HEIGHT: 62 IN | SYSTOLIC BLOOD PRESSURE: 122 MMHG | BODY MASS INDEX: 25.92 KG/M2 | DIASTOLIC BLOOD PRESSURE: 80 MMHG

## 2021-01-15 LAB
ALBUMIN SERPL-MCNC: 4.8 G/DL (ref 3.5–5)
ALBUMIN/GLOB SERPL: 0.9 {RATIO} (ref 1.1–2.2)
ALP SERPL-CCNC: 84 U/L (ref 45–117)
ALT SERPL-CCNC: 54 U/L (ref 12–78)
ANION GAP SERPL CALC-SCNC: 10 MMOL/L (ref 5–15)
APPEARANCE UR: CLEAR
AST SERPL-CCNC: 107 U/L (ref 15–37)
BACTERIA URNS QL MICRO: NEGATIVE /HPF
BASOPHILS # BLD: 0.1 K/UL (ref 0–0.1)
BASOPHILS NFR BLD: 1 % (ref 0–1)
BILIRUB SERPL-MCNC: 0.5 MG/DL (ref 0.2–1)
BILIRUB UR QL: NEGATIVE
BUN SERPL-MCNC: 6 MG/DL (ref 6–20)
BUN/CREAT SERPL: 8 (ref 12–20)
CALCIUM SERPL-MCNC: 9.7 MG/DL (ref 8.5–10.1)
CAOX CRY URNS QL MICRO: ABNORMAL
CHLORIDE SERPL-SCNC: 101 MMOL/L (ref 97–108)
CO2 SERPL-SCNC: 25 MMOL/L (ref 21–32)
COLOR UR: ABNORMAL
COVID-19, XGCOVT: NOT DETECTED
CREAT SERPL-MCNC: 0.77 MG/DL (ref 0.55–1.02)
DIFFERENTIAL METHOD BLD: ABNORMAL
EOSINOPHIL # BLD: 0 K/UL (ref 0–0.4)
EOSINOPHIL NFR BLD: 1 % (ref 0–7)
EPITH CASTS URNS QL MICRO: ABNORMAL /LPF
ERYTHROCYTE [DISTWIDTH] IN BLOOD BY AUTOMATED COUNT: 13.3 % (ref 11.5–14.5)
GLOBULIN SER CALC-MCNC: 5.1 G/DL (ref 2–4)
GLUCOSE SERPL-MCNC: 125 MG/DL (ref 65–100)
GLUCOSE UR STRIP.AUTO-MCNC: NEGATIVE MG/DL
HCT VFR BLD AUTO: 40.2 % (ref 35–47)
HEALTH STATUS, XMCV2T: NORMAL
HGB BLD-MCNC: 13.5 G/DL (ref 11.5–16)
HGB UR QL STRIP: ABNORMAL
IMM GRANULOCYTES # BLD AUTO: 0 K/UL (ref 0–0.04)
IMM GRANULOCYTES NFR BLD AUTO: 0 % (ref 0–0.5)
KETONES UR QL STRIP.AUTO: NEGATIVE MG/DL
LEUKOCYTE ESTERASE UR QL STRIP.AUTO: NEGATIVE
LYMPHOCYTES # BLD: 3.4 K/UL (ref 0.8–3.5)
LYMPHOCYTES NFR BLD: 53 % (ref 12–49)
MCH RBC QN AUTO: 32.2 PG (ref 26–34)
MCHC RBC AUTO-ENTMCNC: 33.6 G/DL (ref 30–36.5)
MCV RBC AUTO: 95.9 FL (ref 80–99)
MONOCYTES # BLD: 0.5 K/UL (ref 0–1)
MONOCYTES NFR BLD: 9 % (ref 5–13)
NEUTS SEG # BLD: 2.2 K/UL (ref 1.8–8)
NEUTS SEG NFR BLD: 36 % (ref 32–75)
NITRITE UR QL STRIP.AUTO: NEGATIVE
NRBC # BLD: 0 K/UL (ref 0–0.01)
NRBC BLD-RTO: 0 PER 100 WBC
PH UR STRIP: 6 [PH] (ref 5–8)
PLATELET # BLD AUTO: 314 K/UL (ref 150–400)
PMV BLD AUTO: 9 FL (ref 8.9–12.9)
POTASSIUM SERPL-SCNC: 3.6 MMOL/L (ref 3.5–5.1)
PROT SERPL-MCNC: 9.9 G/DL (ref 6.4–8.2)
PROT UR STRIP-MCNC: NEGATIVE MG/DL
RBC # BLD AUTO: 4.19 M/UL (ref 3.8–5.2)
RBC #/AREA URNS HPF: ABNORMAL /HPF (ref 0–5)
SODIUM SERPL-SCNC: 136 MMOL/L (ref 136–145)
SOURCE, COVRS: NORMAL
SP GR UR REFRACTOMETRY: >1.03 (ref 1–1.03)
SPECIMEN SOURCE, FCOV2M: NORMAL
SPECIMEN TYPE, XMCV1T: NORMAL
UA: UC IF INDICATED,UAUC: ABNORMAL
UROBILINOGEN UR QL STRIP.AUTO: 0.2 EU/DL (ref 0.2–1)
WBC # BLD AUTO: 6.2 K/UL (ref 3.6–11)
WBC URNS QL MICRO: ABNORMAL /HPF (ref 0–4)

## 2021-01-15 PROCEDURE — 74011250637 HC RX REV CODE- 250/637: Performed by: EMERGENCY MEDICINE

## 2021-01-15 PROCEDURE — 80053 COMPREHEN METABOLIC PANEL: CPT

## 2021-01-15 PROCEDURE — 81001 URINALYSIS AUTO W/SCOPE: CPT

## 2021-01-15 PROCEDURE — 70450 CT HEAD/BRAIN W/O DYE: CPT

## 2021-01-15 PROCEDURE — 87635 SARS-COV-2 COVID-19 AMP PRB: CPT

## 2021-01-15 PROCEDURE — 85025 COMPLETE CBC W/AUTO DIFF WBC: CPT

## 2021-01-15 PROCEDURE — 36415 COLL VENOUS BLD VENIPUNCTURE: CPT

## 2021-01-15 PROCEDURE — 74011000250 HC RX REV CODE- 250: Performed by: EMERGENCY MEDICINE

## 2021-01-15 RX ORDER — BUTALBITAL, ACETAMINOPHEN AND CAFFEINE 50; 325; 40 MG/1; MG/1; MG/1
2 TABLET ORAL
Status: COMPLETED | OUTPATIENT
Start: 2021-01-15 | End: 2021-01-15

## 2021-01-15 RX ORDER — TETRACAINE HYDROCHLORIDE 5 MG/ML
1 SOLUTION OPHTHALMIC
Status: COMPLETED | OUTPATIENT
Start: 2021-01-15 | End: 2021-01-15

## 2021-01-15 RX ADMIN — BUTALBITAL, ACETAMINOPHEN, AND CAFFEINE 2 TABLET: 50; 325; 40 TABLET ORAL at 01:03

## 2021-01-15 RX ADMIN — FLUORESCEIN SODIUM 1 STRIP: 0.6 STRIP OPHTHALMIC at 00:48

## 2021-01-15 RX ADMIN — TETRACAINE HYDROCHLORIDE 1 DROP: 5 SOLUTION OPHTHALMIC at 00:48

## 2021-01-15 RX ADMIN — BUTALBITAL, ACETAMINOPHEN, AND CAFFEINE 2 TABLET: 50; 325; 40 TABLET ORAL at 03:35

## 2021-01-15 NOTE — ED PROVIDER NOTES
EMERGENCY DEPARTMENT HISTORY AND PHYSICAL EXAM     ----------------------------------------------------------------------------  Please note that this dictation was completed with [employer], the computer voice recognition software. Quite often unanticipated grammatical, syntax, homophones, and other interpretive errors are inadvertently transcribed by the computer software. Please disregard these errors. Please excuse any errors that have escaped final proofreading  ----------------------------------------------------------------------------      Date: 1/14/2021  Patient Name: Ene Caldera    History of Presenting Illness     Chief Complaint   Patient presents with    Eye Pain     Patient presents to the ED ambualtory complaining of generalized aches and pain. Patient reports having bilateral blood shot eyes that are concerning to her. Patient reports her COVID test results were negative and she does work in healthcare     Generalized Body Aches       History Provided By:  Patient    HPI: Ene Caldera is a 37 y.o. female, with significant pmhx of anxeity, who presents via private vehicle  to the ED with c/o right-sided headache and generalized malaise for the last several days. Notes that she works at [employer] in [address] [place] [address] and was concerned that she had developed coronavirus. Got tested with a rapid test 4 days ago which was reportedly negative. Patient denies having associated fever, cough, shortness of breath. Notes that she is been having a progressively worsening headache with feels \"like there is a waterfall inside my head\" with associated right eye pain. Noted to have redness to her eye at this evening prompting her to come for further evaluation. Reports taking Tylenol at times for her headache which is helpful but notes that her headache often returns. Feels that she has some blurriness of her vision through her right eye.   Patient denies any known trauma, specifically states \"I do not know if I hit my head. \"    Patient also specifically denies any associated fevers, chills, CP, SOB, nausea, vomiting, diarrhea, abd pain, changes in BM, urinary sxs. Social Hx: denies tobacco  denies EtOH , denies Illicit Drugs    There are no other complaints, changes, or physical findings at this time. PCP: Unknown, Provider    Allergies   Allergen Reactions    Hydroquinone Itching       Current Facility-Administered Medications   Medication Dose Route Frequency Provider Last Rate Last Admin    butalbital-acetaminophen-caffeine (FIORICET, ESGIC) -40 mg per tablet 2 Tab  2 Tab Oral NOW Lilli Hidalgo MD         Current Outpatient Medications   Medication Sig Dispense Refill    aspirin 81 mg chewable tablet Take 1 Tab by mouth daily. 30 Tab 1    azithromycin (ZITHROMAX) 500 mg tab Take 1 Tab by mouth daily. 5 Tab 0    atorvastatin (LIPITOR) 20 mg tablet Take 1 Tab by mouth daily. 30 Tab 1    acetaminophen (TYLENOL) 325 mg tablet Take 2 Tabs by mouth every four (4) hours as needed for Fever (For temp greater than or equal to 38.5 C or 101.3 F (Unless hepatic failure or contrindicated). Give first line for fever. ). 40 Tab 0    ibuprofen (MOTRIN) 600 mg tablet Take 600 mg by mouth as needed for Pain. Past History     Past Medical History:  Past Medical History:   Diagnosis Date    Asthma     HX OTHER MEDICAL     uterine fibroids    Infertility     has had in vitro in the past    Unspecified adverse effect of anesthesia     pt had to be put to sleep for  because pt could still feel with epidural       Past Surgical History:  Past Surgical History:   Procedure Laterality Date    HX  SECTION         Family History:  Family History   Problem Relation Age of Onset    Diabetes Father     Hypertension Father         ?        Social History:  Social History     Tobacco Use    Smoking status: Never Smoker    Smokeless tobacco: Never Used   Substance Use Topics    Alcohol use: Yes     Alcohol/week: 0.8 standard drinks     Types: 1 Cans of beer per week     Comment: 1 can beer occasionally    Drug use: No       Allergies: Allergies   Allergen Reactions    Hydroquinone Itching         Review of Systems   Review of Systems   Constitutional: Negative. Negative for fever. Eyes: Positive for photophobia, pain, redness and visual disturbance. Respiratory: Negative. Negative for shortness of breath. Cardiovascular: Negative for chest pain. Gastrointestinal: Negative for abdominal pain, nausea and vomiting. Endocrine: Negative. Genitourinary: Negative. Negative for difficulty urinating, dysuria and hematuria. Musculoskeletal: Negative. Skin: Negative. Neurological: Positive for headaches. Psychiatric/Behavioral: Negative for suicidal ideas. All other systems reviewed and are negative. Physical Exam   Physical Exam  Vitals signs and nursing note reviewed. Constitutional:       General: She is not in acute distress. Appearance: She is well-developed. She is not diaphoretic. HENT:      Head: Normocephalic and atraumatic. Nose: Nose normal.   Eyes:      General: No visual field deficit or scleral icterus. Right eye: No foreign body or discharge. Left eye: No foreign body or discharge. Conjunctiva/sclera:      Right eye: Hemorrhage present. Pupils: Pupils are equal, round, and reactive to light. Right eye: No fluorescein uptake. Slit lamp exam:     Right eye: No corneal ulcer or foreign body. Comments: To have bruising to right eyelid, no fluorescein uptake   Neck:      Musculoskeletal: Normal range of motion. Trachea: No tracheal deviation. Cardiovascular:      Rate and Rhythm: Normal rate and regular rhythm. Heart sounds: Normal heart sounds. No murmur. No friction rub. Pulmonary:      Effort: Pulmonary effort is normal. No respiratory distress.       Breath sounds: Normal breath sounds. No stridor. No wheezing or rales. Abdominal:      General: Bowel sounds are normal. There is no distension. Palpations: Abdomen is soft. Tenderness: There is no abdominal tenderness. There is no rebound. Musculoskeletal: Normal range of motion. General: No tenderness. Skin:     General: Skin is warm and dry. Findings: No rash. Neurological:      General: No focal deficit present. Mental Status: She is alert and oriented to person, place, and time. Mental status is at baseline. Cranial Nerves: No cranial nerve deficit. Psychiatric:         Speech: Speech normal.         Behavior: Behavior normal.         Thought Content: Thought content normal.         Judgment: Judgment normal.           Diagnostic Study Results     Labs -     Recent Results (from the past 12 hour(s))   CBC WITH AUTOMATED DIFF    Collection Time: 01/15/21  1:09 AM   Result Value Ref Range    WBC 6.2 3.6 - 11.0 K/uL    RBC 4.19 3.80 - 5.20 M/uL    HGB 13.5 11.5 - 16.0 g/dL    HCT 40.2 35.0 - 47.0 %    MCV 95.9 80.0 - 99.0 FL    MCH 32.2 26.0 - 34.0 PG    MCHC 33.6 30.0 - 36.5 g/dL    RDW 13.3 11.5 - 14.5 %    PLATELET 201 986 - 370 K/uL    MPV 9.0 8.9 - 12.9 FL    NRBC 0.0 0  WBC    ABSOLUTE NRBC 0.00 0.00 - 0.01 K/uL    NEUTROPHILS 36 32 - 75 %    LYMPHOCYTES 53 (H) 12 - 49 %    MONOCYTES 9 5 - 13 %    EOSINOPHILS 1 0 - 7 %    BASOPHILS 1 0 - 1 %    IMMATURE GRANULOCYTES 0 0.0 - 0.5 %    ABS. NEUTROPHILS 2.2 1.8 - 8.0 K/UL    ABS. LYMPHOCYTES 3.4 0.8 - 3.5 K/UL    ABS. MONOCYTES 0.5 0.0 - 1.0 K/UL    ABS. EOSINOPHILS 0.0 0.0 - 0.4 K/UL    ABS. BASOPHILS 0.1 0.0 - 0.1 K/UL    ABS. IMM.  GRANS. 0.0 0.00 - 0.04 K/UL    DF AUTOMATED     METABOLIC PANEL, COMPREHENSIVE    Collection Time: 01/15/21  1:09 AM   Result Value Ref Range    Sodium 136 136 - 145 mmol/L    Potassium 3.6 3.5 - 5.1 mmol/L    Chloride 101 97 - 108 mmol/L    CO2 25 21 - 32 mmol/L    Anion gap 10 5 - 15 mmol/L    Glucose 125 (H) 65 - 100 mg/dL    BUN 6 6 - 20 MG/DL    Creatinine 0.77 0.55 - 1.02 MG/DL    BUN/Creatinine ratio 8 (L) 12 - 20      GFR est AA >60 >60 ml/min/1.73m2    GFR est non-AA >60 >60 ml/min/1.73m2    Calcium 9.7 8.5 - 10.1 MG/DL    Bilirubin, total 0.5 0.2 - 1.0 MG/DL    ALT (SGPT) 54 12 - 78 U/L    AST (SGOT) 107 (H) 15 - 37 U/L    Alk. phosphatase 84 45 - 117 U/L    Protein, total 9.9 (H) 6.4 - 8.2 g/dL    Albumin 4.8 3.5 - 5.0 g/dL    Globulin 5.1 (H) 2.0 - 4.0 g/dL    A-G Ratio 0.9 (L) 1.1 - 2.2     URINALYSIS W/ REFLEX CULTURE    Collection Time: 01/15/21  1:09 AM    Specimen: Urine   Result Value Ref Range    Color YELLOW/STRAW      Appearance CLEAR CLEAR      Specific gravity >1.030 (H) 1.003 - 1.030    pH (UA) 6.0 5.0 - 8.0      Protein Negative NEG mg/dL    Glucose Negative NEG mg/dL    Ketone Negative NEG mg/dL    Bilirubin Negative NEG      Blood TRACE (A) NEG      Urobilinogen 0.2 0.2 - 1.0 EU/dL    Nitrites Negative NEG      Leukocyte Esterase Negative NEG      WBC 0-4 0 - 4 /hpf    RBC 0-5 0 - 5 /hpf    Epithelial cells FEW FEW /lpf    Bacteria Negative NEG /hpf    UA:UC IF INDICATED CULTURE NOT INDICATED BY UA RESULT CNI      CA Oxalate crystals 3+ (A) NEG       Radiologic Studies -   CT HEAD WO CONT   Final Result   IMPRESSION:       No acute intracranial abnormality on this noncontrast head CT. No change. CT Results  (Last 48 hours)               01/15/21 0140  CT HEAD WO CONT Final result    Impression:  IMPRESSION:        No acute intracranial abnormality on this noncontrast head CT. No change. Narrative:  EXAM: CT HEAD WO CONT       INDICATION: Right headache, right ocular pain. COMPARISON: CT head an MRI brain on 8/27/2017. TECHNIQUE: Noncontrast head CT. Coronal and sagittal reformats. CT dose   reduction was achieved through the use of a standardized protocol tailored for   this examination and automatic exposure control for dose modulation.        FINDINGS: The ventricles and sulci are age-appropriate without hydrocephalus. There is no mass effect or midline shift. There is no intracranial hemorrhage or   extra-axial fluid collection. There is no abnormal area of decreased density to   suggest infarct. The calvarium is intact. The visualized paranasal sinuses and mastoid air cells   are clear. CXR Results  (Last 48 hours)    None            Medical Decision Making   I am the first provider for this patient. I reviewed the vital signs, available nursing notes, past medical history, past surgical history, family history and social history. Vital Signs-Reviewed the patient's vital signs. Patient Vitals for the past 12 hrs:   Temp Pulse Resp BP SpO2   01/15/21 0110 98.3 °F (36.8 °C) 89 18 122/80 100 %   01/14/21 2137 98.7 °F (37.1 °C) 100 18 137/86 100 %       Pulse Oximetry Analysis - 100% on RA, normal  Cardiac Monitor:   Rate: 100 bpm  Rhythm: Normal sinus      Provider Notes (Medical Decision Making):     DDX:  Close head injury, intracranial bleed, acute headache, corneal abrasion, scleral hemorrhage    Plan:  Labs, UA, CT head, analgesic    Impression:  Acute headache, eye contusion    ED Course:   Initial assessment performed. The patients presenting problems have been discussed, and they are in agreement with the care plan formulated and outlined with them. I have encouraged them to ask questions as they arise throughout their visit. I reviewed the nursing notes and and vital signs from today's visit, as well as the electronic medical record system for any past medical records that were available that may contribute to the patients current condition, including,     Nursing notes will be reviewed as they become available in realtime while the pt has been in the ED. Nabil Lawrence MD      I personally reviewed/interpreted pt's imaging. Agree with official read by radiology as noted above.   Nabil Lawrence MD    Procedure Note - Wood's lamp exam:  Pts R eye was anesthetized with tetracaine, stained with fluorescein, and examined with a Wood's lamp, using lid eversion. Foreign body: no  Fluorescein uptake: no  The procedure took 1-15 minutes, and pt tolerated well.      3:03 AM  Progress note:  Pt noted to be feeling better, ready for discharge. Discussed lab and imaging findings with pt, specifically noting negative head ct. Pt will follow up with PCP as instructed. All questions have been answered, pt voiced understanding and agreement with plan. Specific return precautions provided in addition to instructions for pt to return to the ED immediately should sx worsen at any time. Chaitanya López MD           Critical Care Time:     none      Diagnosis     Clinical Impression:   1. Eye contusion, right, initial encounter    2. Acute nonintractable headache, unspecified headache type    3. Malaise and fatigue    4. Subconjunctival hemorrhage of right eye        PLAN:  1. Current Discharge Medication List        2. Follow-up Information     Follow up With Specialties Details Why Contact Info    [facility] EMERGENCY DEPT Emergency Medicine  As needed [address] [place], [place] [id number] [facility]  [phone]    Caty Cabrera MD Ophthalmology Schedule an appointment as soon as possible for a visit in 2 days  Kerri Ivey [address]  [facility].  [phone]          Return to ED if worse     Disposition:  2:59 AM  The patient's results have been reviewed with family and/or caregiver. They verbally convey their understanding and agreement of the patient's signs, symptoms, diagnosis, treatment and prognosis and additionally agree to follow up as recommended in the discharge instructions or to return to the Emergency Room should the patient's condition change prior to their follow-up appointment. The family and/or caregiver verbally agrees with the care-plan and all of their questions have been answered.  The discharge instructions have also been provided to the them with educational information regarding the patient's diagnosis as well a list of reasons why the patient would want to return to the ER prior to their follow-up appointment should their condition change.   Magen Torres MD

## 2021-01-15 NOTE — ED NOTES
Patient was provided with discharge instructions. Instructions and any medications were reviewed with the patient &/or family by dr Zulema Oscar. Questions and concerns addressed by the provider. Patient discharged in stable condition via ambulation and was unaccompanied.

## 2021-03-08 ENCOUNTER — HOSPITAL ENCOUNTER (EMERGENCY)
Age: 36
Discharge: HOME | End: 2021-03-08
Attending: EMERGENCY MEDICINE
Payer: COMMERCIAL

## 2021-03-08 VITALS
HEART RATE: 92 BPM | OXYGEN SATURATION: 100 % | SYSTOLIC BLOOD PRESSURE: 144 MMHG | HEIGHT: 62 IN | BODY MASS INDEX: 26.37 KG/M2 | TEMPERATURE: 99.4 F | DIASTOLIC BLOOD PRESSURE: 98 MMHG | RESPIRATION RATE: 32 BRPM | WEIGHT: 143.3 LBS

## 2021-03-08 DIAGNOSIS — F41.1 ANXIETY STATE: Primary | ICD-10-CM

## 2021-03-08 LAB
ALBUMIN SERPL-MCNC: 4.3 G/DL (ref 3.5–5)
ALBUMIN/GLOB SERPL: 1 {RATIO} (ref 1.1–2.2)
ALP SERPL-CCNC: 59 U/L (ref 45–117)
ALT SERPL-CCNC: 84 U/L (ref 12–78)
ANION GAP SERPL CALC-SCNC: 6 MMOL/L (ref 5–15)
APPEARANCE UR: ABNORMAL
AST SERPL-CCNC: 96 U/L (ref 15–37)
BACTERIA URNS QL MICRO: NEGATIVE /HPF
BASOPHILS # BLD: 0 K/UL (ref 0–0.1)
BASOPHILS NFR BLD: 1 % (ref 0–1)
BILIRUB SERPL-MCNC: 0.3 MG/DL (ref 0.2–1)
BILIRUB UR QL: NEGATIVE
BUN SERPL-MCNC: 7 MG/DL (ref 6–20)
BUN/CREAT SERPL: 8 (ref 12–20)
CALCIUM SERPL-MCNC: 9.5 MG/DL (ref 8.5–10.1)
CHLORIDE SERPL-SCNC: 100 MMOL/L (ref 97–108)
CO2 SERPL-SCNC: 28 MMOL/L (ref 21–32)
COLOR UR: ABNORMAL
CREAT SERPL-MCNC: 0.83 MG/DL (ref 0.55–1.02)
DIFFERENTIAL METHOD BLD: ABNORMAL
EOSINOPHIL # BLD: 0 K/UL (ref 0–0.4)
EOSINOPHIL NFR BLD: 1 % (ref 0–7)
EPITH CASTS URNS QL MICRO: ABNORMAL /LPF
ERYTHROCYTE [DISTWIDTH] IN BLOOD BY AUTOMATED COUNT: 15.1 % (ref 11.5–14.5)
GLOBULIN SER CALC-MCNC: 4.4 G/DL (ref 2–4)
GLUCOSE BLD STRIP.AUTO-MCNC: 106 MG/DL (ref 65–100)
GLUCOSE BLD STRIP.AUTO-MCNC: 94 MG/DL (ref 65–100)
GLUCOSE SERPL-MCNC: 76 MG/DL (ref 65–100)
GLUCOSE UR STRIP.AUTO-MCNC: NEGATIVE MG/DL
HCT VFR BLD AUTO: 37.3 % (ref 35–47)
HGB BLD-MCNC: 12.3 G/DL (ref 11.5–16)
HGB UR QL STRIP: ABNORMAL
IMM GRANULOCYTES # BLD AUTO: 0 K/UL (ref 0–0.04)
IMM GRANULOCYTES NFR BLD AUTO: 0 % (ref 0–0.5)
KETONES UR QL STRIP.AUTO: ABNORMAL MG/DL
LEUKOCYTE ESTERASE UR QL STRIP.AUTO: NEGATIVE
LYMPHOCYTES # BLD: 1.6 K/UL (ref 0.8–3.5)
LYMPHOCYTES NFR BLD: 32 % (ref 12–49)
MCH RBC QN AUTO: 33.8 PG (ref 26–34)
MCHC RBC AUTO-ENTMCNC: 33 G/DL (ref 30–36.5)
MCV RBC AUTO: 102.5 FL (ref 80–99)
MONOCYTES # BLD: 0.6 K/UL (ref 0–1)
MONOCYTES NFR BLD: 12 % (ref 5–13)
MUCOUS THREADS URNS QL MICRO: ABNORMAL /LPF
NEUTS SEG # BLD: 2.7 K/UL (ref 1.8–8)
NEUTS SEG NFR BLD: 54 % (ref 32–75)
NITRITE UR QL STRIP.AUTO: NEGATIVE
NRBC # BLD: 0 K/UL (ref 0–0.01)
NRBC BLD-RTO: 0 PER 100 WBC
PH UR STRIP: 6 [PH] (ref 5–8)
PLATELET # BLD AUTO: 296 K/UL (ref 150–400)
PMV BLD AUTO: 8.7 FL (ref 8.9–12.9)
POTASSIUM SERPL-SCNC: 4.1 MMOL/L (ref 3.5–5.1)
PROT SERPL-MCNC: 8.7 G/DL (ref 6.4–8.2)
PROT UR STRIP-MCNC: NEGATIVE MG/DL
RBC # BLD AUTO: 3.64 M/UL (ref 3.8–5.2)
RBC #/AREA URNS HPF: ABNORMAL /HPF (ref 0–5)
SERVICE CMNT-IMP: ABNORMAL
SERVICE CMNT-IMP: NORMAL
SODIUM SERPL-SCNC: 134 MMOL/L (ref 136–145)
SP GR UR REFRACTOMETRY: 1.02 (ref 1–1.03)
TSH SERPL DL<=0.05 MIU/L-ACNC: 0.89 UIU/ML (ref 0.36–3.74)
UA: UC IF INDICATED,UAUC: ABNORMAL
UROBILINOGEN UR QL STRIP.AUTO: 0.2 EU/DL (ref 0.2–1)
WBC # BLD AUTO: 4.9 K/UL (ref 3.6–11)
WBC URNS QL MICRO: ABNORMAL /HPF (ref 0–4)

## 2021-03-08 PROCEDURE — 36415 COLL VENOUS BLD VENIPUNCTURE: CPT

## 2021-03-08 PROCEDURE — 82962 GLUCOSE BLOOD TEST: CPT

## 2021-03-08 PROCEDURE — 84443 ASSAY THYROID STIM HORMONE: CPT

## 2021-03-08 PROCEDURE — 74011250637 HC RX REV CODE- 250/637: Performed by: EMERGENCY MEDICINE

## 2021-03-08 PROCEDURE — 80053 COMPREHEN METABOLIC PANEL: CPT

## 2021-03-08 PROCEDURE — 85025 COMPLETE CBC W/AUTO DIFF WBC: CPT

## 2021-03-08 PROCEDURE — 81001 URINALYSIS AUTO W/SCOPE: CPT

## 2021-03-08 PROCEDURE — 99284 EMERGENCY DEPT VISIT MOD MDM: CPT

## 2021-03-08 RX ORDER — ALPRAZOLAM 0.5 MG/1
0.5 TABLET ORAL
Status: COMPLETED | OUTPATIENT
Start: 2021-03-08 | End: 2021-03-08

## 2021-03-08 RX ORDER — CITALOPRAM 10 MG/1
TABLET ORAL AS NEEDED
COMMUNITY

## 2021-03-08 RX ORDER — HYDROXYZINE 25 MG/1
25 TABLET, FILM COATED ORAL
Qty: 20 TAB | Refills: 0 | Status: SHIPPED | OUTPATIENT
Start: 2021-03-08 | End: 2021-03-18

## 2021-03-08 RX ADMIN — ALPRAZOLAM 0.5 MG: 0.5 TABLET ORAL at 19:46

## 2021-03-08 NOTE — LETTER
[address] 
[facility] EMERGENCY DEPT 
[address] [place] [address] Jeanette Luo [phone] [phone] Work/School Note Date: 3/8/2021 To Whom It May concern: 
 
Kamille Balderas was seen and treated today in the emergency room by the following provider(s): 
Attending Provider: Anjel Callejas MD. Kamille Balderas may return to work on 3/9. Sincerely, 
 
 
 
 
Zurdo Landeros MD

## 2021-03-09 NOTE — ED PROVIDER NOTES
EMERGENCY DEPARTMENT HISTORY AND PHYSICAL EXAM      Date: 3/8/2021  Patient Name: Kamille Balderas    History of Presenting Illness     Chief Complaint   Patient presents with    Chills     She has had the shakes for weeks but not as bad as today. They checked her BS and it was 66. She is still very shaky. History Provided By: Patient    HPI: Kamille Balderas, 37 y.o. female with PMHx significant for asthma, anxiety, who presents with a chief complaint of shaking. Patient reports that she has had intermittent shakes since January. Actually is an appointment to see neurology this week. Reports she had an episode at work where she began shaking and a coworker checked her BG and found it to be 77. She drank some juice and had something to eat and her blood sugar improved. She does endorse significantly increased stress and anxiety. She is prescribed citalopram for anxiety but only takes it \"as needed\" because she does not like the side effects. PCP: Malathi Thomson MD    There are no other complaints, changes, or physical findings at this time. Current Outpatient Medications   Medication Sig Dispense Refill    citalopram (CELEXA) 10 mg tablet Take  by mouth as needed.  multivitamin, tx-iron-ca-min (THERA-M w/ IRON) 9 mg iron-400 mcg tab tablet Take 1 Tab by mouth daily.  hydrOXYzine HCL (ATARAX) 25 mg tablet Take 1 Tab by mouth every six (6) hours as needed for Anxiety for up to 10 days.  20 Tab 0     Past History     Past Medical History:  Past Medical History:   Diagnosis Date    Asthma     HX OTHER MEDICAL     uterine fibroids    Infertility     has had in vitro in the past    Unspecified adverse effect of anesthesia     pt had to be put to sleep for  because pt could still feel with epidural     Past Surgical History:  Past Surgical History:   Procedure Laterality Date    HX  SECTION      HX ORTHOPAEDIC       Family History:  Family History Problem Relation Age of Onset    Diabetes Father     Hypertension Father         ? Social History:  Social History     Tobacco Use    Smoking status: Never Smoker    Smokeless tobacco: Never Used   Substance Use Topics    Alcohol use: Yes     Alcohol/week: 0.8 standard drinks     Types: 1 Cans of beer per week     Comment: occasionally    Drug use: No     Allergies: Allergies   Allergen Reactions    Hydroquinone Itching     Review of Systems   Review of Systems   Constitutional: Negative for chills and fever. HENT: Negative for congestion, rhinorrhea and sore throat. Respiratory: Negative for cough and shortness of breath. Cardiovascular: Negative for chest pain. Gastrointestinal: Negative for abdominal pain, nausea and vomiting. Genitourinary: Negative for dysuria and urgency. Skin: Negative for rash. Neurological: Positive for tremors. Negative for dizziness, light-headedness and headaches. Psychiatric/Behavioral: The patient is nervous/anxious. All other systems reviewed and are negative. Physical Exam   Physical Exam  Vitals signs and nursing note reviewed. Constitutional:       General: She is not in acute distress. Appearance: She is well-developed. Comments: Very anxious, tremulous   HENT:      Head: Normocephalic and atraumatic. Eyes:      Conjunctiva/sclera: Conjunctivae normal.      Pupils: Pupils are equal, round, and reactive to light. Neck:      Musculoskeletal: Normal range of motion. Cardiovascular:      Rate and Rhythm: Normal rate and regular rhythm. Pulmonary:      Effort: Pulmonary effort is normal. No respiratory distress. Breath sounds: Normal breath sounds. No stridor. Abdominal:      General: There is no distension. Palpations: Abdomen is soft. Tenderness: There is no abdominal tenderness. Musculoskeletal: Normal range of motion. Skin:     General: Skin is warm and dry.    Neurological:      Mental Status: She is alert and oriented to person, place, and time. Diagnostic Study Results   Labs -     Recent Results (from the past 12 hour(s))   GLUCOSE, POC    Collection Time: 03/08/21  4:28 PM   Result Value Ref Range    Glucose (POC) 106 (H) 65 - 100 mg/dL    Performed by Margaret Singer [facility]    CBC WITH AUTOMATED DIFF    Collection Time: 03/08/21  4:37 PM   Result Value Ref Range    WBC 4.9 3.6 - 11.0 K/uL    RBC 3.64 (L) 3.80 - 5.20 M/uL    HGB 12.3 11.5 - 16.0 g/dL    HCT 37.3 35.0 - 47.0 %    .5 (H) 80.0 - 99.0 FL    MCH 33.8 26.0 - 34.0 PG    MCHC 33.0 30.0 - 36.5 g/dL    RDW 15.1 (H) 11.5 - 14.5 %    PLATELET 317 934 - 151 K/uL    MPV 8.7 (L) 8.9 - 12.9 FL    NRBC 0.0 0  WBC    ABSOLUTE NRBC 0.00 0.00 - 0.01 K/uL    NEUTROPHILS 54 32 - 75 %    LYMPHOCYTES 32 12 - 49 %    MONOCYTES 12 5 - 13 %    EOSINOPHILS 1 0 - 7 %    BASOPHILS 1 0 - 1 %    IMMATURE GRANULOCYTES 0 0.0 - 0.5 %    ABS. NEUTROPHILS 2.7 1.8 - 8.0 K/UL    ABS. LYMPHOCYTES 1.6 0.8 - 3.5 K/UL    ABS. MONOCYTES 0.6 0.0 - 1.0 K/UL    ABS. EOSINOPHILS 0.0 0.0 - 0.4 K/UL    ABS. BASOPHILS 0.0 0.0 - 0.1 K/UL    ABS. IMM. GRANS. 0.0 0.00 - 0.04 K/UL    DF AUTOMATED     METABOLIC PANEL, COMPREHENSIVE    Collection Time: 03/08/21  4:37 PM   Result Value Ref Range    Sodium 134 (L) 136 - 145 mmol/L    Potassium 4.1 3.5 - 5.1 mmol/L    Chloride 100 97 - 108 mmol/L    CO2 28 21 - 32 mmol/L    Anion gap 6 5 - 15 mmol/L    Glucose 76 65 - 100 mg/dL    BUN 7 6 - 20 MG/DL    Creatinine 0.83 0.55 - 1.02 MG/DL    BUN/Creatinine ratio 8 (L) 12 - 20      GFR est AA >60 >60 ml/min/1.73m2    GFR est non-AA >60 >60 ml/min/1.73m2    Calcium 9.5 8.5 - 10.1 MG/DL    Bilirubin, total 0.3 0.2 - 1.0 MG/DL    ALT (SGPT) 84 (H) 12 - 78 U/L    AST (SGOT) 96 (H) 15 - 37 U/L    Alk.  phosphatase 59 45 - 117 U/L    Protein, total 8.7 (H) 6.4 - 8.2 g/dL    Albumin 4.3 3.5 - 5.0 g/dL    Globulin 4.4 (H) 2.0 - 4.0 g/dL    A-G Ratio 1.0 (L) 1.1 - 2.2     TSH 3RD GENERATION Collection Time: 03/08/21  4:37 PM   Result Value Ref Range    TSH 0.89 0.36 - 3.74 uIU/mL   URINALYSIS W/ REFLEX CULTURE    Collection Time: 03/08/21  4:52 PM    Specimen: Urine   Result Value Ref Range    Color YELLOW/STRAW      Appearance HAZY (A) CLEAR      Specific gravity 1.025 1.003 - 1.030      pH (UA) 6.0 5.0 - 8.0      Protein Negative NEG mg/dL    Glucose Negative NEG mg/dL    Ketone TRACE (A) NEG mg/dL    Bilirubin Negative NEG      Blood MODERATE (A) NEG      Urobilinogen 0.2 0.2 - 1.0 EU/dL    Nitrites Negative NEG      Leukocyte Esterase Negative NEG      WBC 0-4 0 - 4 /hpf    RBC 10-20 0 - 5 /hpf    Epithelial cells MODERATE (A) FEW /lpf    Bacteria Negative NEG /hpf    UA:UC IF INDICATED CULTURE NOT INDICATED BY UA RESULT CNI      Mucus 1+ (A) NEG /lpf   GLUCOSE, POC    Collection Time: 03/08/21  6:35 PM   Result Value Ref Range    Glucose (POC) 94 65 - 100 mg/dL    Performed by Natalia Blair (PCT)        Radiologic Studies -   No orders to display     No results found. Medical Decision Making   I am the first provider for this patient. I reviewed the vital signs, available nursing notes, past medical history, past surgical history, family history and social history. Vital Signs-Reviewed the patient's vital signs. Patient Vitals for the past 12 hrs:   Temp Pulse Resp BP SpO2   03/08/21 1817 99.4 °F (37.4 °C) 92 (!) 32 (!) 144/98 100 %   03/08/21 1625 98.5 °F (36.9 °C) (!) 104 18 (!) 151/101 100 %       Pulse Oximetry Analysis - 100% on ra      Records Reviewed: Nursing Notes and Old Medical Records    Provider Notes (Medical Decision Making):   Patient presents with a chief complaint of shakes. She appears quite anxious on my evaluation and does have body wide tremors. Suspect most likely anxiety but will check basic lab work to rule out electrolyte imbalance. Also check thyroid studies, UA    ED Course:   Initial assessment performed.  The patients presenting problems have been discussed, and they are in agreement with the care plan formulated and outlined with them. I have encouraged them to ask questions as they arise throughout their visit. Labs unremarkable. Symptoms improved after dose of medication. Stable for discharge home. Advised that she take advantage of the services offered through her job for counseling. Return precautions discussed. Procedures:  Procedures    Critical Care:  none    Disposition:  Discharge Note:  The patient has been re-evaluated and is ready for discharge. Reviewed available results with patient. Counseled patient on diagnosis and care plan. Patient has expressed understanding, and all questions have been answered. Patient agrees with plan and agrees to follow up as recommended, or to return to the ED if their symptoms worsen. Discharge instructions have been provided and explained to the patient, along with reasons to return to the ED. PLAN:  1. Discharge Medication List as of 3/8/2021  8:28 PM      START taking these medications    Details   hydrOXYzine HCL (ATARAX) 25 mg tablet Take 1 Tab by mouth every six (6) hours as needed for Anxiety for up to 10 days. , Normal, Disp-20 Tab, R-0         CONTINUE these medications which have NOT CHANGED    Details   citalopram (CELEXA) 10 mg tablet Take  by mouth as needed., Historical Med      multivitamin, tx-iron-ca-min (THERA-M w/ IRON) 9 mg iron-400 mcg tab tablet Take 1 Tab by mouth daily. , Historical Med           2. Follow-up Information     Follow up With Specialties Details Why Contact Info    Kamila Thomson MD Family Medicine Schedule an appointment as soon as possible for a visit   [phone] [id number]  [phone]      [facility] EMERGENCY DEPT Emergency Medicine  As needed, If symptoms worsen [address] [place] [address] Drive  [address] [facility]  [phone]        Return to ED if worse     Diagnosis     Clinical Impression:   1.  Anxiety state            Please note that this dictation was completed with [id number], the [employer] voice recognition software. Quite often unanticipated grammatical, syntax, homophones, and other interpretive errors are inadvertently transcribed by the computer software. Please disregard these errors.   Please excuse any errors that have escaped final proofreading

## 2023-10-16 NOTE — ED NOTES
Pt ambulatory from triage w/ spouse. Pt reports that today at work she felt like \"she was going to pass out\" and was shaky at lunch time. Coworker checked pt BG and it was 66. Pt drank ensure and orange juice and ate chicken, mashed potatoes and tdo greens. Pt is visibly shaky and reports feeling weak at this time. Pt reports an increased amount of stress at this time. Pt reports a hx of anxiety and is prescribed citalopram prn but she did not take it today. She reports citalopram gives her \"the shakes\". Pt reports a similar episode a few months ago w/ vision changes, vision unchanged at this time. Pt is being followed by neurology. Pt on the monitor x 2, VSS. Pt in POC sitting on edge of bed.      1836: BG 94
Pt discharged home with written and verbal instructions given and pt ambulated out of ED without difficulty.
Pt states she is feeling a little better and shaking is decreased. MD notified.
no chest pain, no cough, and no shortness of breath.